# Patient Record
Sex: MALE | Race: BLACK OR AFRICAN AMERICAN | URBAN - METROPOLITAN AREA
[De-identification: names, ages, dates, MRNs, and addresses within clinical notes are randomized per-mention and may not be internally consistent; named-entity substitution may affect disease eponyms.]

---

## 2017-12-20 ENCOUNTER — HISTORICAL (OUTPATIENT)
Dept: LAB | Facility: HOSPITAL | Age: 42
End: 2017-12-20

## 2017-12-20 LAB
ABS NEUT (OLG): 3.31 X10(3)/MCL (ref 2.1–9.2)
ALBUMIN SERPL-MCNC: 4.2 GM/DL (ref 3.4–5)
ALBUMIN/GLOB SERPL: 1.2 {RATIO}
ALP SERPL-CCNC: 67 UNIT/L (ref 50–136)
ALT SERPL-CCNC: 48 UNIT/L (ref 12–78)
AST SERPL-CCNC: 19 UNIT/L (ref 15–37)
BASOPHILS # BLD AUTO: 0 X10(3)/MCL (ref 0–0.2)
BASOPHILS NFR BLD AUTO: 0 %
BILIRUB SERPL-MCNC: 0.4 MG/DL (ref 0.2–1)
BILIRUBIN DIRECT+TOT PNL SERPL-MCNC: 0.1 MG/DL (ref 0–0.2)
BILIRUBIN DIRECT+TOT PNL SERPL-MCNC: 0.3 MG/DL (ref 0–0.8)
BUN SERPL-MCNC: 15 MG/DL (ref 7–18)
CALCIUM SERPL-MCNC: 9.7 MG/DL (ref 8.5–10.1)
CHLORIDE SERPL-SCNC: 102 MMOL/L (ref 98–107)
CHOLEST SERPL-MCNC: 177 MG/DL (ref 0–200)
CHOLEST/HDLC SERPL: 2.6 {RATIO} (ref 0–5)
CO2 SERPL-SCNC: 26 MMOL/L (ref 21–32)
CREAT SERPL-MCNC: 0.9 MG/DL (ref 0.7–1.3)
CREAT UR-MCNC: 159 MG/DL
EOSINOPHIL # BLD AUTO: 0.1 X10(3)/MCL (ref 0–0.9)
EOSINOPHIL NFR BLD AUTO: 2 %
ERYTHROCYTE [DISTWIDTH] IN BLOOD BY AUTOMATED COUNT: 12.4 % (ref 11.5–17)
EST. AVERAGE GLUCOSE BLD GHB EST-MCNC: 134 MG/DL
GLOBULIN SER-MCNC: 3.4 GM/DL (ref 2.4–3.5)
GLUCOSE SERPL-MCNC: 176 MG/DL (ref 74–106)
HBA1C MFR BLD: 6.3 % (ref 4.2–6.3)
HCT VFR BLD AUTO: 44.9 % (ref 42–52)
HDLC SERPL-MCNC: 68 MG/DL (ref 35–60)
HGB BLD-MCNC: 15.6 GM/DL (ref 14–18)
LDLC SERPL CALC-MCNC: 89 MG/DL (ref 0–129)
LYMPHOCYTES # BLD AUTO: 1.5 X10(3)/MCL (ref 0.6–4.6)
LYMPHOCYTES NFR BLD AUTO: 27 %
MCH RBC QN AUTO: 28.9 PG (ref 27–31)
MCHC RBC AUTO-ENTMCNC: 34.7 GM/DL (ref 33–36)
MCV RBC AUTO: 83.3 FL (ref 80–94)
MICROALBUMIN UR-MCNC: 0.5 MG/DL
MICROALBUMIN/CREAT RATIO PNL UR: 3.3 MG/GM CR (ref 0–30)
MONOCYTES # BLD AUTO: 0.5 X10(3)/MCL (ref 0.1–1.3)
MONOCYTES NFR BLD AUTO: 10 %
NEUTROPHILS # BLD AUTO: 3.31 X10(3)/MCL (ref 2.1–9.2)
NEUTROPHILS NFR BLD AUTO: 60 %
PLATELET # BLD AUTO: 184 X10(3)/MCL (ref 130–400)
PMV BLD AUTO: 10.9 FL (ref 9.4–12.4)
POTASSIUM SERPL-SCNC: 4.5 MMOL/L (ref 3.5–5.1)
PROT SERPL-MCNC: 7.6 GM/DL (ref 6.4–8.2)
RBC # BLD AUTO: 5.39 X10(6)/MCL (ref 4.7–6.1)
SODIUM SERPL-SCNC: 137 MMOL/L (ref 136–145)
TESTOST SERPL-MCNC: 461.9 NG/DL (ref 241–827)
TRIGL SERPL-MCNC: 99 MG/DL (ref 30–150)
TSH SERPL-ACNC: 1.1 MIU/ML (ref 0.36–3.74)
VLDLC SERPL CALC-MCNC: 20 MG/DL
WBC # SPEC AUTO: 5.5 X10(3)/MCL (ref 4.5–11.5)

## 2018-08-27 ENCOUNTER — HISTORICAL (OUTPATIENT)
Dept: LAB | Facility: HOSPITAL | Age: 43
End: 2018-08-27

## 2018-08-27 LAB
BUN SERPL-MCNC: 19 MG/DL (ref 7–18)
CALCIUM SERPL-MCNC: 9.1 MG/DL (ref 8.5–10.1)
CHLORIDE SERPL-SCNC: 102 MMOL/L (ref 98–107)
CO2 SERPL-SCNC: 29 MMOL/L (ref 21–32)
CREAT SERPL-MCNC: 1.04 MG/DL (ref 0.7–1.3)
CREAT/UREA NIT SERPL: 18.3
EST. AVERAGE GLUCOSE BLD GHB EST-MCNC: 148 MG/DL
GLUCOSE SERPL-MCNC: 164 MG/DL (ref 74–106)
HBA1C MFR BLD: 6.8 % (ref 4.2–6.3)
POTASSIUM SERPL-SCNC: 4 MMOL/L (ref 3.5–5.1)
SODIUM SERPL-SCNC: 138 MMOL/L (ref 136–145)

## 2019-03-25 ENCOUNTER — HISTORICAL (OUTPATIENT)
Dept: LAB | Facility: HOSPITAL | Age: 44
End: 2019-03-25

## 2019-03-25 LAB
ABS NEUT (OLG): 3.3 X10(3)/MCL (ref 2.1–9.2)
ALBUMIN SERPL-MCNC: 4.4 GM/DL (ref 3.4–5)
ALBUMIN/GLOB SERPL: 1.4 RATIO (ref 1.1–2)
ALP SERPL-CCNC: 73 UNIT/L (ref 50–136)
ALT SERPL-CCNC: 55 UNIT/L (ref 12–78)
AST SERPL-CCNC: 30 UNIT/L (ref 15–37)
BASOPHILS # BLD AUTO: 0 X10(3)/MCL (ref 0–0.2)
BASOPHILS NFR BLD AUTO: 0 %
BILIRUB SERPL-MCNC: 0.2 MG/DL (ref 0.2–1)
BILIRUBIN DIRECT+TOT PNL SERPL-MCNC: 0.1 MG/DL (ref 0–0.5)
BILIRUBIN DIRECT+TOT PNL SERPL-MCNC: 0.1 MG/DL (ref 0–0.8)
BUN SERPL-MCNC: 18 MG/DL (ref 7–18)
CALCIUM SERPL-MCNC: 9.7 MG/DL (ref 8.5–10.1)
CHLORIDE SERPL-SCNC: 106 MMOL/L (ref 98–107)
CHOLEST SERPL-MCNC: 168 MG/DL (ref 0–200)
CHOLEST/HDLC SERPL: 2.4 {RATIO} (ref 0–5)
CO2 SERPL-SCNC: 28 MMOL/L (ref 21–32)
CREAT SERPL-MCNC: 1.12 MG/DL (ref 0.7–1.3)
CREAT UR-MCNC: 107 MG/DL
EOSINOPHIL # BLD AUTO: 0.1 X10(3)/MCL (ref 0–0.9)
EOSINOPHIL NFR BLD AUTO: 2 %
ERYTHROCYTE [DISTWIDTH] IN BLOOD BY AUTOMATED COUNT: 12 % (ref 11.5–17)
EST. AVERAGE GLUCOSE BLD GHB EST-MCNC: 140 MG/DL
GLOBULIN SER-MCNC: 3.2 GM/DL (ref 2.4–3.5)
GLUCOSE SERPL-MCNC: 123 MG/DL (ref 74–106)
HBA1C MFR BLD: 6.5 % (ref 4.2–6.3)
HCT VFR BLD AUTO: 47.3 % (ref 42–52)
HDLC SERPL-MCNC: 70 MG/DL (ref 35–60)
HGB BLD-MCNC: 15.5 GM/DL (ref 14–18)
LDLC SERPL CALC-MCNC: 72 MG/DL (ref 0–129)
LYMPHOCYTES # BLD AUTO: 1.6 X10(3)/MCL (ref 0.6–4.6)
LYMPHOCYTES NFR BLD AUTO: 28 %
MCH RBC QN AUTO: 29 PG (ref 27–31)
MCHC RBC AUTO-ENTMCNC: 32.8 GM/DL (ref 33–36)
MCV RBC AUTO: 88.4 FL (ref 80–94)
MICROALBUMIN UR-MCNC: <0.5 MG/DL
MICROALBUMIN/CREAT RATIO PNL UR: <4.7 MG/GM CR (ref 0–30)
MONOCYTES # BLD AUTO: 0.6 X10(3)/MCL (ref 0.1–1.3)
MONOCYTES NFR BLD AUTO: 10 %
NEUTROPHILS # BLD AUTO: 3.3 X10(3)/MCL (ref 2.1–9.2)
NEUTROPHILS NFR BLD AUTO: 59 %
NRBC BLD AUTO-RTO: 0.4 % (ref 0–0.2)
PLATELET # BLD AUTO: 188 X10(3)/MCL (ref 130–400)
PMV BLD AUTO: 11.6 FL (ref 9.4–12.4)
POTASSIUM SERPL-SCNC: 4.4 MMOL/L (ref 3.5–5.1)
PROT SERPL-MCNC: 7.6 GM/DL (ref 6.4–8.2)
RBC # BLD AUTO: 5.35 X10(6)/MCL (ref 4.7–6.1)
SODIUM SERPL-SCNC: 140 MMOL/L (ref 136–145)
TRIGL SERPL-MCNC: 130 MG/DL (ref 30–150)
VLDLC SERPL CALC-MCNC: 26 MG/DL
WBC # SPEC AUTO: 5.6 X10(3)/MCL (ref 4.5–11.5)

## 2019-05-21 ENCOUNTER — HISTORICAL (OUTPATIENT)
Dept: LAB | Facility: HOSPITAL | Age: 44
End: 2019-05-21

## 2019-05-21 LAB — TSH SERPL-ACNC: 1.45 MIU/L (ref 0.36–3.74)

## 2020-06-01 ENCOUNTER — HISTORICAL (OUTPATIENT)
Dept: ADMINISTRATIVE | Facility: HOSPITAL | Age: 45
End: 2020-06-01

## 2020-06-01 LAB
ABS NEUT (OLG): 2.9 X10(3)/MCL (ref 2.1–9.2)
ALBUMIN SERPL-MCNC: 4.2 GM/DL (ref 3.5–5)
ALBUMIN/GLOB SERPL: 1.4 RATIO (ref 1.1–2)
ALP SERPL-CCNC: 81 UNIT/L (ref 40–150)
ALT SERPL-CCNC: 42 UNIT/L (ref 0–55)
AST SERPL-CCNC: 24 UNIT/L (ref 5–34)
BASOPHILS # BLD AUTO: 0 X10(3)/MCL (ref 0–0.2)
BASOPHILS NFR BLD AUTO: 1 %
BILIRUB SERPL-MCNC: 0.4 MG/DL
BILIRUBIN DIRECT+TOT PNL SERPL-MCNC: 0.1 MG/DL (ref 0–0.5)
BILIRUBIN DIRECT+TOT PNL SERPL-MCNC: 0.3 MG/DL (ref 0–0.8)
BUN SERPL-MCNC: 14.8 MG/DL (ref 8.9–20.6)
CALCIUM SERPL-MCNC: 8.9 MG/DL (ref 8.4–10.2)
CHLORIDE SERPL-SCNC: 105 MMOL/L (ref 98–107)
CHOLEST SERPL-MCNC: 202 MG/DL
CHOLEST/HDLC SERPL: 4 {RATIO} (ref 0–5)
CO2 SERPL-SCNC: 25 MMOL/L (ref 22–29)
CREAT SERPL-MCNC: 1.17 MG/DL (ref 0.73–1.18)
EOSINOPHIL # BLD AUTO: 0.1 X10(3)/MCL (ref 0–0.9)
EOSINOPHIL NFR BLD AUTO: 2 %
ERYTHROCYTE [DISTWIDTH] IN BLOOD BY AUTOMATED COUNT: 12 % (ref 11.5–17)
EST. AVERAGE GLUCOSE BLD GHB EST-MCNC: 177.2 MG/DL
FT4I SERPL CALC-MCNC: 2.6 (ref 2.6–3.6)
GLOBULIN SER-MCNC: 2.9 GM/DL (ref 2.4–3.5)
GLUCOSE SERPL-MCNC: 277 MG/DL (ref 74–100)
HBA1C MFR BLD: 7.8 %
HCT VFR BLD AUTO: 44.5 % (ref 42–52)
HDLC SERPL-MCNC: 56 MG/DL (ref 35–60)
HGB BLD-MCNC: 15 GM/DL (ref 14–18)
LDLC SERPL CALC-MCNC: 128 MG/DL (ref 50–140)
LYMPHOCYTES # BLD AUTO: 1.2 X10(3)/MCL (ref 0.6–4.6)
LYMPHOCYTES NFR BLD AUTO: 26 %
MCH RBC QN AUTO: 29.4 PG (ref 27–31)
MCHC RBC AUTO-ENTMCNC: 33.7 GM/DL (ref 33–36)
MCV RBC AUTO: 87.1 FL (ref 80–94)
MONOCYTES # BLD AUTO: 0.4 X10(3)/MCL (ref 0.1–1.3)
MONOCYTES NFR BLD AUTO: 9 %
NEUTROPHILS # BLD AUTO: 2.9 X10(3)/MCL (ref 2.1–9.2)
NEUTROPHILS NFR BLD AUTO: 61 %
PLATELET # BLD AUTO: 173 X10(3)/MCL (ref 130–400)
PMV BLD AUTO: 11.7 FL (ref 9.4–12.4)
POTASSIUM SERPL-SCNC: 4.5 MMOL/L (ref 3.5–5.1)
PROT SERPL-MCNC: 7.1 GM/DL (ref 6.4–8.3)
PSA SERPL-MCNC: 0.56 NG/ML
RBC # BLD AUTO: 5.11 X10(6)/MCL (ref 4.7–6.1)
SODIUM SERPL-SCNC: 137 MMOL/L (ref 136–145)
T3FREE SERPL-MCNC: 3.76 PG/ML (ref 1.71–3.71)
T3RU NFR SERPL: 37.63 % (ref 31–39)
T4 FREE SERPL-MCNC: 0.9 NG/DL (ref 0.7–1.48)
T4 SERPL-MCNC: 6.92 UG/DL (ref 4.87–11.72)
TRIGL SERPL-MCNC: 90 MG/DL (ref 34–140)
TSH SERPL-ACNC: 0.79 UIU/ML (ref 0.35–4.94)
VLDLC SERPL CALC-MCNC: 18 MG/DL
WBC # SPEC AUTO: 4.7 X10(3)/MCL (ref 4.5–11.5)

## 2020-06-03 ENCOUNTER — HISTORICAL (OUTPATIENT)
Dept: ADMINISTRATIVE | Facility: HOSPITAL | Age: 45
End: 2020-06-03

## 2020-06-03 LAB
CREAT UR-MCNC: 199.2 MG/DL (ref 58–161)
MICROALBUMIN UR-MCNC: 6.1 UG/ML
MICROALBUMIN/CREAT RATIO PNL UR: 3.1 MG/GM CR (ref 0–30)

## 2020-12-02 ENCOUNTER — HISTORICAL (OUTPATIENT)
Dept: ADMINISTRATIVE | Facility: HOSPITAL | Age: 45
End: 2020-12-02

## 2020-12-02 LAB
EST. AVERAGE GLUCOSE BLD GHB EST-MCNC: 165.7 MG/DL
HBA1C MFR BLD: 7.4 %

## 2021-09-09 ENCOUNTER — HISTORICAL (OUTPATIENT)
Dept: ADMINISTRATIVE | Facility: HOSPITAL | Age: 46
End: 2021-09-09

## 2021-09-09 LAB
ALBUMIN SERPL-MCNC: 4 GM/DL (ref 3.5–5)
ALBUMIN/GLOB SERPL: 1.3 RATIO (ref 1.1–2)
ALP SERPL-CCNC: 100 UNIT/L (ref 40–150)
ALT SERPL-CCNC: 29 UNIT/L (ref 0–55)
AST SERPL-CCNC: 31 UNIT/L (ref 5–34)
BILIRUB SERPL-MCNC: 0.3 MG/DL
BILIRUBIN DIRECT+TOT PNL SERPL-MCNC: 0.1 MG/DL (ref 0–0.5)
BILIRUBIN DIRECT+TOT PNL SERPL-MCNC: 0.2 MG/DL (ref 0–0.8)
BUN SERPL-MCNC: 12.6 MG/DL (ref 8.9–20.6)
CALCIUM SERPL-MCNC: 9.7 MG/DL (ref 8.4–10.2)
CHLORIDE SERPL-SCNC: 103 MMOL/L (ref 98–107)
CHOLEST SERPL-MCNC: 171 MG/DL
CHOLEST/HDLC SERPL: 3 {RATIO} (ref 0–5)
CO2 SERPL-SCNC: 29 MMOL/L (ref 22–29)
CREAT SERPL-MCNC: 1.11 MG/DL (ref 0.73–1.18)
CREAT UR-MCNC: 72.6 MG/DL (ref 58–161)
DEPRECATED CALCIDIOL+CALCIFEROL SERPL-MC: 68.3 NG/ML (ref 30–80)
EST. AVERAGE GLUCOSE BLD GHB EST-MCNC: 177.2 MG/DL
GLOBULIN SER-MCNC: 3 GM/DL (ref 2.4–3.5)
GLUCOSE SERPL-MCNC: 204 MG/DL (ref 74–100)
HBA1C MFR BLD: 7.8 %
HDLC SERPL-MCNC: 56 MG/DL (ref 35–60)
LDLC SERPL CALC-MCNC: 86 MG/DL (ref 50–140)
MICROALBUMIN UR-MCNC: <5 UG/ML
MICROALBUMIN/CREAT RATIO PNL UR: <6.9 MG/GM CR (ref 0–30)
POTASSIUM SERPL-SCNC: 4.2 MMOL/L (ref 3.5–5.1)
PROT SERPL-MCNC: 7 GM/DL (ref 6.4–8.3)
PSA SERPL-MCNC: 0.47 NG/ML
SODIUM SERPL-SCNC: 142 MMOL/L (ref 136–145)
TRIGL SERPL-MCNC: 147 MG/DL (ref 34–140)
VLDLC SERPL CALC-MCNC: 29 MG/DL

## 2022-03-25 ENCOUNTER — HISTORICAL (OUTPATIENT)
Dept: ADMINISTRATIVE | Facility: HOSPITAL | Age: 47
End: 2022-03-25

## 2022-03-25 LAB
CREAT UR-MCNC: 45.7 MG/DL (ref 58–161)
EST. AVERAGE GLUCOSE BLD GHB EST-MCNC: 197.2 MG/DL
HBA1C MFR BLD: 8.5 %
MICROALBUMIN UR-MCNC: <5
MICROALBUMIN/CREAT RATIO PNL UR: <10.9 (ref 0–30)

## 2022-04-10 ENCOUNTER — HISTORICAL (OUTPATIENT)
Dept: ADMINISTRATIVE | Facility: HOSPITAL | Age: 47
End: 2022-04-10

## 2022-04-24 VITALS
OXYGEN SATURATION: 99 % | DIASTOLIC BLOOD PRESSURE: 88 MMHG | HEIGHT: 67 IN | BODY MASS INDEX: 29.76 KG/M2 | WEIGHT: 189.63 LBS | SYSTOLIC BLOOD PRESSURE: 138 MMHG

## 2022-05-20 RX ORDER — GLIPIZIDE 10 MG/1
10 TABLET, FILM COATED, EXTENDED RELEASE ORAL DAILY
Qty: 90 TABLET | Refills: 3 | Status: SHIPPED | OUTPATIENT
Start: 2022-05-20 | End: 2023-03-03 | Stop reason: SDUPTHER

## 2022-05-20 RX ORDER — GLIPIZIDE 10 MG/1
10 TABLET, FILM COATED, EXTENDED RELEASE ORAL DAILY
COMMUNITY
Start: 2021-12-28 | End: 2022-05-20 | Stop reason: SDUPTHER

## 2022-06-16 RX ORDER — PIOGLITAZONEHYDROCHLORIDE 30 MG/1
30 TABLET ORAL DAILY
Qty: 90 TABLET | Refills: 3 | Status: SHIPPED | OUTPATIENT
Start: 2022-06-16 | End: 2022-09-30

## 2022-06-16 RX ORDER — PIOGLITAZONEHYDROCHLORIDE 30 MG/1
30 TABLET ORAL DAILY
COMMUNITY
End: 2022-06-16 | Stop reason: SDUPTHER

## 2022-06-16 NOTE — TELEPHONE ENCOUNTER
----- Message from Jennifer Jin Patient Care Assistant sent at 6/16/2022  8:12 AM CDT -----  Regarding: return call  Type:  Patient Returning Call    Who Called: pt  Who Left Message for Patient: for nurse  Does the patient know what this is regarding?: medication  Would the patient rather a call back or a response via [facility]? C/b  Best Call Back Number: [phone] or [phone]  Additional Information: pt states was supposed to get a change to one of his meds and the pharmacy never received the new script and pt isnt sure the name keeps saying pioziltazone to be increased from 15mg to 30mg. Please call pt back to discuss.

## 2022-06-16 NOTE — TELEPHONE ENCOUNTER
Dr Sherman,    According to your last result note. You were increasing his Pioglitazone to 30mg. Can you send in a script for that dose please?

## 2022-07-14 ENCOUNTER — TELEPHONE (OUTPATIENT)
Dept: PRIMARY CARE CLINIC | Facility: CLINIC | Age: 47
End: 2022-07-14

## 2022-07-14 NOTE — TELEPHONE ENCOUNTER
Called patient and told him his last A1C which was in March. Pt confirmed and had no questions or concerns

## 2022-07-14 NOTE — TELEPHONE ENCOUNTER
----- Message from Jv Lopez sent at 7/14/2022  3:09 PM CDT -----  Regarding: TEST RESULTS  Type:  Patient Returning Call    Who Called: pt  Who Left Message for Patient: nurse   Does the patient know what this is regarding?: yes  Would the patient rather a call back or a response via [facility]?  Call   Best Call Back Number: [phone]  Additional Information: pt requesting a call back about his last A1C results

## 2022-08-09 RX ORDER — METFORMIN HYDROCHLORIDE 1000 MG/1
1000 TABLET ORAL 2 TIMES DAILY WITH MEALS
Qty: 180 TABLET | Refills: 3 | Status: SHIPPED | OUTPATIENT
Start: 2022-08-09 | End: 2022-09-30

## 2022-08-09 RX ORDER — METFORMIN HYDROCHLORIDE 1000 MG/1
1000 TABLET ORAL
COMMUNITY
Start: 2021-09-14 | End: 2022-08-09 | Stop reason: SDUPTHER

## 2022-08-09 NOTE — TELEPHONE ENCOUNTER
----- Message from Jv John sent at 8/9/2022  9:40 AM CDT -----  Regarding: rx request  Type:  RX Refill Request    Who Called: pt  Refill or New Rx: refill  RX Name and Strength: Metformin  How is the patient currently taking it? (ex. 1XDay): daily  Is this a 30 day or 90 day RX: 30  Preferred Pharmacy with phone number: [id number] - [place] [id number] AT [facility]  Local or Mail Order: local  Ordering Provider: lane  Would the patient rather a call back or a response via [facility]? na  Best Call Back Number: mobile  Additional Information:  patient stated he spoke with Lane at last appointment about some metformin being called in, but never received it  -- also said he thinks the mg was 500 but wasn't too sure

## 2022-08-19 RX ORDER — CHLORTHALIDONE 25 MG/1
25 TABLET ORAL DAILY
COMMUNITY
Start: 2022-07-17 | End: 2022-08-19 | Stop reason: SDUPTHER

## 2022-08-19 NOTE — TELEPHONE ENCOUNTER
Type:  RX Refill Request    Who Called: self  Refill or New Rx:refill  RX Name and Strength:chlorthalidone 25mg  How is the patient currently taking it? (ex. 1XDay):1 xday  Is this a 30 day or 90 day RX:90day  Preferred Pharmacy with phone number:[facility]  Local or Mail Order:local  Ordering Provider:rosalinda  Would the patient rather a call back or a response via [facility]? Call back  Best Call Back Number:[phone]  Additional Information:

## 2022-08-21 RX ORDER — CHLORTHALIDONE 25 MG/1
25 TABLET ORAL DAILY
Qty: 90 TABLET | Refills: 2 | Status: SHIPPED | OUTPATIENT
Start: 2022-08-21 | End: 2023-03-03 | Stop reason: SDUPTHER

## 2022-08-31 ENCOUNTER — DOCUMENTATION ONLY (OUTPATIENT)
Dept: PRIMARY CARE CLINIC | Facility: CLINIC | Age: 47
End: 2022-08-31

## 2022-08-31 LAB
LEFT EYE DM RETINOPATHY: NEGATIVE
RIGHT EYE DM RETINOPATHY: NEGATIVE

## 2022-09-06 DIAGNOSIS — I10 PRIMARY HYPERTENSION: Primary | ICD-10-CM

## 2022-09-06 RX ORDER — OLMESARTAN MEDOXOMIL 40 MG/1
40 TABLET ORAL DAILY
COMMUNITY
Start: 2022-05-26 | End: 2022-09-06 | Stop reason: SDUPTHER

## 2022-09-06 RX ORDER — OLMESARTAN MEDOXOMIL 40 MG/1
40 TABLET ORAL DAILY
Qty: 90 TABLET | Refills: 3 | Status: SHIPPED | OUTPATIENT
Start: 2022-09-06 | End: 2023-03-03 | Stop reason: SDUPTHER

## 2022-09-21 RX ORDER — SERTRALINE HYDROCHLORIDE 50 MG/1
50 TABLET, FILM COATED ORAL DAILY
COMMUNITY
Start: 2022-06-09 | End: 2023-04-24

## 2022-09-21 RX ORDER — HYDRALAZINE HYDROCHLORIDE 50 MG/1
50 TABLET, FILM COATED ORAL 2 TIMES DAILY
COMMUNITY
Start: 2022-08-25 | End: 2023-11-29 | Stop reason: SDUPTHER

## 2022-09-21 RX ORDER — INSULIN PUMP SYRINGE, 3 ML
EACH MISCELLANEOUS
COMMUNITY
Start: 2021-07-06

## 2022-09-21 RX ORDER — CARVEDILOL 25 MG/1
50 TABLET ORAL 2 TIMES DAILY
COMMUNITY
Start: 2022-08-27 | End: 2023-04-24 | Stop reason: SDUPTHER

## 2022-09-21 RX ORDER — AMLODIPINE BESYLATE 10 MG/1
TABLET ORAL
COMMUNITY
Start: 2022-08-15 | End: 2023-04-24 | Stop reason: SDUPTHER

## 2022-09-26 DIAGNOSIS — Z13.220 ENCOUNTER FOR LIPID SCREENING FOR CARDIOVASCULAR DISEASE: ICD-10-CM

## 2022-09-26 DIAGNOSIS — Z00.00 ENCOUNTER FOR WELLNESS EXAMINATION: Primary | ICD-10-CM

## 2022-09-26 DIAGNOSIS — Z13.6 ENCOUNTER FOR LIPID SCREENING FOR CARDIOVASCULAR DISEASE: ICD-10-CM

## 2022-09-28 ENCOUNTER — CLINICAL SUPPORT (OUTPATIENT)
Dept: PRIMARY CARE CLINIC | Facility: CLINIC | Age: 47
End: 2022-09-28
Payer: COMMERCIAL

## 2022-09-28 DIAGNOSIS — Z13.220 ENCOUNTER FOR LIPID SCREENING FOR CARDIOVASCULAR DISEASE: ICD-10-CM

## 2022-09-28 DIAGNOSIS — Z00.00 ENCOUNTER FOR WELLNESS EXAMINATION: ICD-10-CM

## 2022-09-28 DIAGNOSIS — Z13.6 ENCOUNTER FOR LIPID SCREENING FOR CARDIOVASCULAR DISEASE: ICD-10-CM

## 2022-09-28 LAB
ALBUMIN SERPL-MCNC: 4.4 GM/DL (ref 3.5–5)
ALBUMIN/GLOB SERPL: 1.4 RATIO (ref 1.1–2)
ALP SERPL-CCNC: 68 UNIT/L (ref 40–150)
ALT SERPL-CCNC: 27 UNIT/L (ref 0–55)
AST SERPL-CCNC: 22 UNIT/L (ref 5–34)
BASOPHILS # BLD AUTO: 0.02 X10(3)/MCL (ref 0–0.2)
BASOPHILS NFR BLD AUTO: 0.4 %
BILIRUBIN DIRECT+TOT PNL SERPL-MCNC: 0.6 MG/DL
BUN SERPL-MCNC: 16.1 MG/DL (ref 8.9–20.6)
CALCIUM SERPL-MCNC: 9.8 MG/DL (ref 8.4–10.2)
CHLORIDE SERPL-SCNC: 99 MMOL/L (ref 98–107)
CHOLEST SERPL-MCNC: 206 MG/DL
CHOLEST/HDLC SERPL: 4 {RATIO} (ref 0–5)
CO2 SERPL-SCNC: 28 MMOL/L (ref 22–29)
CREAT SERPL-MCNC: 1.18 MG/DL (ref 0.73–1.18)
EOSINOPHIL # BLD AUTO: 0.21 X10(3)/MCL (ref 0–0.9)
EOSINOPHIL NFR BLD AUTO: 4.2 %
ERYTHROCYTE [DISTWIDTH] IN BLOOD BY AUTOMATED COUNT: 12.3 % (ref 11.5–17)
EST. AVERAGE GLUCOSE BLD GHB EST-MCNC: 197.3 MG/DL
GFR SERPLBLD CREATININE-BSD FMLA CKD-EPI: >60 MLS/MIN/1.73/M2
GLOBULIN SER-MCNC: 3.1 GM/DL (ref 2.4–3.5)
GLUCOSE SERPL-MCNC: 262 MG/DL (ref 74–100)
HBA1C MFR BLD: 8.5 %
HCT VFR BLD AUTO: 46.5 % (ref 42–52)
HDLC SERPL-MCNC: 58 MG/DL (ref 35–60)
HGB BLD-MCNC: 15.6 GM/DL (ref 14–18)
IMM GRANULOCYTES # BLD AUTO: 0.01 X10(3)/MCL (ref 0–0.04)
IMM GRANULOCYTES NFR BLD AUTO: 0.2 %
LDLC SERPL CALC-MCNC: 107 MG/DL (ref 50–140)
LYMPHOCYTES # BLD AUTO: 1.21 X10(3)/MCL (ref 0.6–4.6)
LYMPHOCYTES NFR BLD AUTO: 24.1 %
MCH RBC QN AUTO: 29.5 PG (ref 27–31)
MCHC RBC AUTO-ENTMCNC: 33.5 MG/DL (ref 33–36)
MCV RBC AUTO: 88.1 FL (ref 80–94)
MONOCYTES # BLD AUTO: 0.41 X10(3)/MCL (ref 0.1–1.3)
MONOCYTES NFR BLD AUTO: 8.2 %
NEUTROPHILS # BLD AUTO: 3.2 X10(3)/MCL (ref 2.1–9.2)
NEUTROPHILS NFR BLD AUTO: 62.9 %
NRBC BLD AUTO-RTO: 0 %
PLATELET # BLD AUTO: 175 X10(3)/MCL (ref 130–400)
PMV BLD AUTO: 11.8 FL (ref 7.4–10.4)
POTASSIUM SERPL-SCNC: 3.5 MMOL/L (ref 3.5–5.1)
PROT SERPL-MCNC: 7.5 GM/DL (ref 6.4–8.3)
PSA SERPL-MCNC: 0.46 NG/ML
RBC # BLD AUTO: 5.28 X10(6)/MCL (ref 4.7–6.1)
SODIUM SERPL-SCNC: 136 MMOL/L (ref 136–145)
TRIGL SERPL-MCNC: 206 MG/DL (ref 34–140)
TSH SERPL-ACNC: 0.74 UIU/ML (ref 0.35–4.94)
VLDLC SERPL CALC-MCNC: 41 MG/DL
WBC # SPEC AUTO: 5 X10(3)/MCL (ref 4.5–11.5)

## 2022-09-28 PROCEDURE — 86803 HEPATITIS C AB TEST: CPT

## 2022-09-28 PROCEDURE — 36415 COLL VENOUS BLD VENIPUNCTURE: CPT

## 2022-09-28 PROCEDURE — 83036 HEMOGLOBIN GLYCOSYLATED A1C: CPT | Performed by: GENERAL PRACTICE

## 2022-09-28 PROCEDURE — 84153 ASSAY OF PSA TOTAL: CPT | Performed by: GENERAL PRACTICE

## 2022-09-28 PROCEDURE — 80053 COMPREHEN METABOLIC PANEL: CPT | Performed by: GENERAL PRACTICE

## 2022-09-28 PROCEDURE — 84443 ASSAY THYROID STIM HORMONE: CPT | Performed by: GENERAL PRACTICE

## 2022-09-28 PROCEDURE — 85025 COMPLETE CBC W/AUTO DIFF WBC: CPT | Performed by: GENERAL PRACTICE

## 2022-09-28 PROCEDURE — 80061 LIPID PANEL: CPT | Performed by: GENERAL PRACTICE

## 2022-09-29 PROBLEM — E78.5 DYSLIPIDEMIA: Chronic | Status: ACTIVE | Noted: 2022-09-29

## 2022-09-29 PROBLEM — F41.1 GENERALIZED ANXIETY DISORDER: Chronic | Status: ACTIVE | Noted: 2022-09-29

## 2022-09-29 PROBLEM — I10 PRIMARY HYPERTENSION: Chronic | Status: ACTIVE | Noted: 2022-09-29

## 2022-09-29 PROBLEM — E66.9 OBESITY: Status: ACTIVE | Noted: 2022-09-29

## 2022-09-29 PROBLEM — E11.9 DIABETES MELLITUS: Status: ACTIVE | Noted: 2022-09-29

## 2022-09-29 PROBLEM — Z78.9 DRUG INTOLERANCE: Status: ACTIVE | Noted: 2022-09-29

## 2022-09-29 PROBLEM — M77.11 LATERAL EPICONDYLITIS OF RIGHT ELBOW: Status: ACTIVE | Noted: 2022-09-29

## 2022-09-29 PROBLEM — E11.9 TYPE 2 DIABETES MELLITUS WITHOUT COMPLICATION, WITHOUT LONG-TERM CURRENT USE OF INSULIN: Chronic | Status: ACTIVE | Noted: 2022-09-29

## 2022-09-29 PROBLEM — E11.65 TYPE 2 DIABETES MELLITUS WITH HYPERGLYCEMIA, WITHOUT LONG-TERM CURRENT USE OF INSULIN: Chronic | Status: ACTIVE | Noted: 2022-09-29

## 2022-09-29 PROBLEM — E78.5 HYPERLIPIDEMIA: Status: ACTIVE | Noted: 2022-09-29

## 2022-09-29 PROBLEM — I10 HYPERTENSION: Status: ACTIVE | Noted: 2022-09-29

## 2022-09-29 PROBLEM — E66.9 OBESITY: Chronic | Status: ACTIVE | Noted: 2022-09-29

## 2022-09-29 PROBLEM — F41.1 GENERALIZED ANXIETY DISORDER: Status: ACTIVE | Noted: 2022-09-29

## 2022-09-29 PROBLEM — E11.65 TYPE 2 DIABETES MELLITUS WITH HYPERGLYCEMIA, WITHOUT LONG-TERM CURRENT USE OF INSULIN: Status: ACTIVE | Noted: 2022-09-29

## 2022-09-29 PROBLEM — Z78.9 STATIN INTOLERANCE: Chronic | Status: ACTIVE | Noted: 2022-09-29

## 2022-09-29 LAB — HCV AB SERPL QL IA: NONREACTIVE

## 2022-09-30 ENCOUNTER — OFFICE VISIT (OUTPATIENT)
Dept: PRIMARY CARE CLINIC | Facility: CLINIC | Age: 47
End: 2022-09-30
Payer: COMMERCIAL

## 2022-09-30 VITALS
SYSTOLIC BLOOD PRESSURE: 130 MMHG | OXYGEN SATURATION: 100 % | DIASTOLIC BLOOD PRESSURE: 86 MMHG | HEART RATE: 62 BPM | TEMPERATURE: 98 F | RESPIRATION RATE: 18 BRPM | WEIGHT: 192 LBS | HEIGHT: 67 IN | BODY MASS INDEX: 30.13 KG/M2

## 2022-09-30 DIAGNOSIS — F41.1 GENERALIZED ANXIETY DISORDER: Chronic | ICD-10-CM

## 2022-09-30 DIAGNOSIS — I10 PRIMARY HYPERTENSION: Chronic | ICD-10-CM

## 2022-09-30 DIAGNOSIS — E11.65 TYPE 2 DIABETES MELLITUS WITH HYPERGLYCEMIA, WITHOUT LONG-TERM CURRENT USE OF INSULIN: Chronic | ICD-10-CM

## 2022-09-30 DIAGNOSIS — Z12.5 SCREENING FOR PROSTATE CANCER: ICD-10-CM

## 2022-09-30 DIAGNOSIS — E78.5 DYSLIPIDEMIA: Chronic | ICD-10-CM

## 2022-09-30 DIAGNOSIS — Z00.00 WELLNESS EXAMINATION: Primary | ICD-10-CM

## 2022-09-30 PROCEDURE — 3079F DIAST BP 80-89 MM HG: CPT | Mod: CPTII,,, | Performed by: GENERAL PRACTICE

## 2022-09-30 PROCEDURE — 3061F PR NEG MICROALBUMINURIA RESULT DOCUMENTED/REVIEW: ICD-10-PCS | Mod: CPTII,,, | Performed by: GENERAL PRACTICE

## 2022-09-30 PROCEDURE — 3008F PR BODY MASS INDEX (BMI) DOCUMENTED: ICD-10-PCS | Mod: CPTII,,, | Performed by: GENERAL PRACTICE

## 2022-09-30 PROCEDURE — 99396 PR PREVENTIVE VISIT,EST,40-64: ICD-10-PCS | Mod: ,,, | Performed by: GENERAL PRACTICE

## 2022-09-30 PROCEDURE — 3061F NEG MICROALBUMINURIA REV: CPT | Mod: CPTII,,, | Performed by: GENERAL PRACTICE

## 2022-09-30 PROCEDURE — 4010F ACE/ARB THERAPY RXD/TAKEN: CPT | Mod: CPTII,,, | Performed by: GENERAL PRACTICE

## 2022-09-30 PROCEDURE — 3066F PR DOCUMENTATION OF TREATMENT FOR NEPHROPATHY: ICD-10-PCS | Mod: CPTII,,, | Performed by: GENERAL PRACTICE

## 2022-09-30 PROCEDURE — 1160F PR REVIEW ALL MEDS BY PRESCRIBER/CLIN PHARMACIST DOCUMENTED: ICD-10-PCS | Mod: CPTII,,, | Performed by: GENERAL PRACTICE

## 2022-09-30 PROCEDURE — 3066F NEPHROPATHY DOC TX: CPT | Mod: CPTII,,, | Performed by: GENERAL PRACTICE

## 2022-09-30 PROCEDURE — 3079F PR MOST RECENT DIASTOLIC BLOOD PRESSURE 80-89 MM HG: ICD-10-PCS | Mod: CPTII,,, | Performed by: GENERAL PRACTICE

## 2022-09-30 PROCEDURE — 3008F BODY MASS INDEX DOCD: CPT | Mod: CPTII,,, | Performed by: GENERAL PRACTICE

## 2022-09-30 PROCEDURE — 3075F PR MOST RECENT SYSTOLIC BLOOD PRESS GE 130-139MM HG: ICD-10-PCS | Mod: CPTII,,, | Performed by: GENERAL PRACTICE

## 2022-09-30 PROCEDURE — 99396 PREV VISIT EST AGE 40-64: CPT | Mod: ,,, | Performed by: GENERAL PRACTICE

## 2022-09-30 PROCEDURE — 1160F RVW MEDS BY RX/DR IN RCRD: CPT | Mod: CPTII,,, | Performed by: GENERAL PRACTICE

## 2022-09-30 PROCEDURE — 4010F PR ACE/ARB THEARPY RXD/TAKEN: ICD-10-PCS | Mod: CPTII,,, | Performed by: GENERAL PRACTICE

## 2022-09-30 PROCEDURE — 3075F SYST BP GE 130 - 139MM HG: CPT | Mod: CPTII,,, | Performed by: GENERAL PRACTICE

## 2022-09-30 PROCEDURE — 1159F MED LIST DOCD IN RCRD: CPT | Mod: CPTII,,, | Performed by: GENERAL PRACTICE

## 2022-09-30 PROCEDURE — 1159F PR MEDICATION LIST DOCUMENTED IN MEDICAL RECORD: ICD-10-PCS | Mod: CPTII,,, | Performed by: GENERAL PRACTICE

## 2022-09-30 RX ORDER — PIOGLITAZONEHYDROCHLORIDE 45 MG/1
45 TABLET ORAL DAILY
Qty: 30 TABLET | Refills: 11 | Status: SHIPPED | OUTPATIENT
Start: 2022-09-30 | End: 2023-03-03 | Stop reason: SDUPTHER

## 2022-09-30 RX ORDER — HYDROCHLOROTHIAZIDE 25 MG/1
25 TABLET ORAL DAILY
COMMUNITY
Start: 2022-09-12 | End: 2023-03-03

## 2022-09-30 RX ORDER — METFORMIN HYDROCHLORIDE 500 MG/1
500 TABLET ORAL 2 TIMES DAILY
COMMUNITY
Start: 2022-09-12 | End: 2022-09-30 | Stop reason: SINTOL

## 2022-09-30 RX ORDER — METFORMIN HYDROCHLORIDE 500 MG/1
500 TABLET, FILM COATED, EXTENDED RELEASE ORAL
Qty: 30 TABLET | Refills: 11 | Status: SHIPPED | OUTPATIENT
Start: 2022-09-30 | End: 2023-03-03 | Stop reason: SDUPTHER

## 2022-09-30 RX ORDER — EZETIMIBE 10 MG/1
10 TABLET ORAL DAILY
COMMUNITY
Start: 2022-09-12 | End: 2023-04-24

## 2022-09-30 RX ORDER — DAPAGLIFLOZIN 10 MG/1
10 TABLET, FILM COATED ORAL DAILY
COMMUNITY
Start: 2022-09-12 | End: 2024-03-12

## 2022-09-30 NOTE — PROGRESS NOTES
"Subjective:       Patient ID: Humphrey Gallegos is a 47 y.o. male.    Chief Complaint: No chief complaint on file.    Patient presents to the clinic today for wellness examination.  Current and past medical history reviewed  Pertinent family and social history reviewed    Colorectal cancer screening:  CRC screening reviewed  Prostate CA screening:  Prostate CA screening reviewed  Vaccinations due:  All vaccinations due and/or desired have been addressed and given.    No complaints today.      Review of Systems   Constitutional: Negative.  Negative for fatigue and fever.   HENT: Negative.  Negative for sore throat and trouble swallowing.    Eyes: Negative.  Negative for redness and visual disturbance.   Respiratory: Negative.  Negative for chest tightness and shortness of breath.    Cardiovascular: Negative.  Negative for chest pain.   Gastrointestinal: Negative.  Negative for abdominal pain, blood in stool and nausea.   Endocrine: Negative.  Negative for cold intolerance, heat intolerance and polyuria.   Genitourinary: Negative.    Musculoskeletal: Negative.  Negative for arthralgias, gait problem and joint swelling.   Integumentary:  Negative for rash. Negative.   Neurological: Negative.  Negative for dizziness, weakness and headaches.   Psychiatric/Behavioral: Negative.  Negative for agitation and dysphoric mood.        Objective:     Vitals:    09/30/22 0832   BP: 130/86   Pulse: 62   Resp: 18   Temp: 97.7 °F (36.5 °C)   SpO2: 100%   Weight: 87.1 kg (192 lb)   Height: 5' 7" (1.702 m)   Body mass index is 30.07 kg/m².     Physical Exam  Constitutional:       Appearance: Normal appearance.   HENT:      Head: Normocephalic.      Mouth/Throat:      Pharynx: Oropharynx is clear.   Eyes:      Conjunctiva/sclera: Conjunctivae normal.      Pupils: Pupils are equal, round, and reactive to light.   Cardiovascular:      Rate and Rhythm: Normal rate and regular rhythm.      Heart sounds: Normal heart sounds.   Pulmonary:      " Effort: Pulmonary effort is normal.      Breath sounds: Normal breath sounds.   Abdominal:      General: Abdomen is flat.      Palpations: Abdomen is soft.   Musculoskeletal:         General: Normal range of motion.      Cervical back: Neck supple.   Skin:     General: Skin is warm and dry.   Neurological:      General: No focal deficit present.      Mental Status: He is oriented to person, place, and time.   Psychiatric:         Mood and Affect: Mood normal.         Behavior: Behavior normal.         Thought Content: Thought content normal.         Judgment: Judgment normal.       Diabetic foot exam:  Examination performed by myself, Dr. Sherman    Examination of the skin/nails:  Bilateral examination of the feet reveals no significant skin lesions/abnormalities, ulcerations, ulcers, nail abnormalities.  Patient does appear to be using appropriate footwear.    Peripheral pulses:  Dorsalis pedis pulse-normal bilaterally  Posterior tibialis pulse-normal bilaterally  Appropriate amount of foot hair, capillary refill normal    Five point sensation test:  Left foot sensation test-no significant loss of sensation at all points tested  Right foot to cessation test-no significant loss of sensation at all points tested    Foot exam risk category:  0-2 (none or minimal loss of protective sensation with no additional findings)    Foot exam intervention:  Foot care education done at appropriate risk level  Lab Results   Component Value Date     09/28/2022    K 3.5 09/28/2022    CO2 28 09/28/2022    BUN 16.1 09/28/2022    CREATININE 1.18 09/28/2022    CALCIUM 9.8 09/28/2022    ALBUMIN 4.4 09/28/2022    BILITOT 0.6 09/28/2022    ALKPHOS 68 09/28/2022    AST 22 09/28/2022    ALT 27 09/28/2022    EGFRNONAA >60 09/09/2021     Lab Results   Component Value Date    WBC 5.0 09/28/2022    RBC 5.28 09/28/2022    HGB 15.6 09/28/2022    HCT 46.5 09/28/2022    MCV 88.1 09/28/2022    RDW 12.3 09/28/2022     09/28/2022      Lab  Results   Component Value Date    CHOL 206 (H) 09/28/2022    TRIG 206 (H) 09/28/2022    HDL 58 09/28/2022    .00 09/28/2022    TOTALCHOLEST 4 09/28/2022     Lab Results   Component Value Date    TSH 0.7399 09/28/2022     Lab Results   Component Value Date    HGBA1C 8.5 (H) 09/28/2022        Lab Results   Component Value Date    PSA 0.46 09/28/2022     No components found for: VITAMIND    Assessment:     Problem List Items Addressed This Visit          Psychiatric    Generalized anxiety disorder (Chronic)    Current Assessment & Plan     Medical condition is currently stable, continue current treatment plan.            Cardiac/Vascular    Primary hypertension (Chronic)    Current Assessment & Plan     Blood pressures appear to be adequately controlled with current treatment plan, continue medical management and continue home blood pressure checks.  Blood pressure should be checked as discussed during medical visits, and average blood pressure should be no greater than 130/80.         Dyslipidemia (Chronic)    Current Assessment & Plan     Fairly good control on Zetia, continue medication.            Endocrine    Type 2 diabetes mellitus with hyperglycemia, without long-term current use of insulin (Chronic)    Current Assessment & Plan     Make some medication changes, discontinue regular metformin, switch to metformin  mg daily, increase Actos to 45 mg daily, patient feels like he can change his diet a bit to improve his A1c level, he would do that, and we will recheck his A1c, urine microalbumin level, and have a visit in three months.         Relevant Medications    metFORMIN (GLUMETZA) 500 MG (MOD) 24hr tablet    pioglitazone (ACTOS) 45 MG tablet    Other Relevant Orders    Hemoglobin A1C    Microalbumin/Creatinine Ratio, Urine     Other Visit Diagnoses       Wellness examination    -  Primary    Overall health status was reviewed.  Good health habits reinforced.  Annual wellness exams recommended.     Screening for prostate cancer        Prostate specific antigen blood test obtained was essentially normal, there are no significant concerns relating to prostate cancer at this time.                 Medication List with Changes/Refills   New Medications    METFORMIN (GLUMETZA) 500 MG (MOD) 24HR TABLET    Take 1 tablet (500 mg total) by mouth daily with breakfast.    PIOGLITAZONE (ACTOS) 45 MG TABLET    Take 1 tablet (45 mg total) by mouth once daily.   Current Medications    AMLODIPINE (NORVASC) 10 MG TABLET        BLOOD-GLUCOSE METER KIT      ACCU-CHEK GUIDE TEST STRIPS 100S, See Instructions, USE TO TEST BLOOD GLUCOSE ONCE DAILY AS DIRECTED, # 100 unknown unit, 2 Refill(s), Pharmacy: [facility] #[id number], 169, cm, Height/Length Dosing, 12/03/20 14:03:00 CST, 88.5, kg, Weight Dosing...    CARVEDILOL (COREG) 25 MG TABLET    Take 50 mg by mouth 2 (two) times daily.    CHLORTHALIDONE (HYGROTEN) 25 MG TAB    Take 1 tablet (25 mg total) by mouth once daily.    EZETIMIBE (ZETIA) 10 MG TABLET    Take 10 mg by mouth once daily.    FARXIGA 10 MG TABLET    Take 10 mg by mouth once daily.    GLIPIZIDE (GLUCOTROL) 10 MG TR24    Take 1 tablet (10 mg total) by mouth once daily.    HYDRALAZINE (APRESOLINE) 50 MG TABLET    Take 50 mg by mouth 2 (two) times daily.    HYDROCHLOROTHIAZIDE (HYDRODIURIL) 25 MG TABLET    Take 25 mg by mouth once daily.    OLMESARTAN (BENICAR) 40 MG TABLET    Take 1 tablet (40 mg total) by mouth once daily.    SERTRALINE (ZOLOFT) 50 MG TABLET    Take 50 mg by mouth once daily.   Discontinued Medications    METFORMIN (GLUCOPHAGE) 1000 MG TABLET    Take 1 tablet (1,000 mg total) by mouth 2 (two) times daily with meals.    METFORMIN (GLUCOPHAGE) 500 MG TABLET    Take 500 mg by mouth 2 (two) times daily.    PIOGLITAZONE (ACTOS) 30 MG TABLET    Take 1 tablet (30 mg total) by mouth once daily.     Plan:             Follow up in about 3 months (around 12/30/2022) for DM F/up.

## 2022-09-30 NOTE — ASSESSMENT & PLAN NOTE
Make some medication changes, discontinue regular metformin, switch to metformin  mg daily, increase Actos to 45 mg daily, patient feels like he can change his diet a bit to improve his A1c level, he would do that, and we will recheck his A1c, urine microalbumin level, and have a visit in three months.

## 2022-11-08 ENCOUNTER — DOCUMENTATION ONLY (OUTPATIENT)
Dept: ADMINISTRATIVE | Facility: HOSPITAL | Age: 47
End: 2022-11-08
Payer: COMMERCIAL

## 2023-02-23 ENCOUNTER — PATIENT OUTREACH (OUTPATIENT)
Dept: ADMINISTRATIVE | Facility: HOSPITAL | Age: 48
End: 2023-02-23
Payer: COMMERCIAL

## 2023-02-23 NOTE — PROGRESS NOTES
Population Health Outreach.    Follow up call to Humphrey, regarding  wellness screenings, voicemail has no greeting , I did not leave a message.

## 2023-03-02 ENCOUNTER — TELEPHONE (OUTPATIENT)
Dept: PRIMARY CARE CLINIC | Facility: CLINIC | Age: 48
End: 2023-03-02
Payer: COMMERCIAL

## 2023-03-03 ENCOUNTER — DOCUMENTATION ONLY (OUTPATIENT)
Dept: PRIMARY CARE CLINIC | Facility: CLINIC | Age: 48
End: 2023-03-03
Payer: COMMERCIAL

## 2023-03-03 DIAGNOSIS — I10 PRIMARY HYPERTENSION: ICD-10-CM

## 2023-03-03 DIAGNOSIS — E11.65 TYPE 2 DIABETES MELLITUS WITH HYPERGLYCEMIA, WITHOUT LONG-TERM CURRENT USE OF INSULIN: Chronic | ICD-10-CM

## 2023-03-03 LAB
LEFT EYE DM RETINOPATHY: POSITIVE
RIGHT EYE DM RETINOPATHY: POSITIVE

## 2023-03-03 RX ORDER — GLIPIZIDE 10 MG/1
10 TABLET, FILM COATED, EXTENDED RELEASE ORAL DAILY
Qty: 30 TABLET | Refills: 0 | Status: SHIPPED | OUTPATIENT
Start: 2023-03-03 | End: 2023-04-24 | Stop reason: SDUPTHER

## 2023-03-03 RX ORDER — METFORMIN HYDROCHLORIDE 500 MG/1
500 TABLET, FILM COATED, EXTENDED RELEASE ORAL
Qty: 30 TABLET | Refills: 0 | Status: SHIPPED | OUTPATIENT
Start: 2023-03-03 | End: 2023-04-24

## 2023-03-03 RX ORDER — OLMESARTAN MEDOXOMIL 40 MG/1
40 TABLET ORAL DAILY
Qty: 30 TABLET | Refills: 0 | Status: SHIPPED | OUTPATIENT
Start: 2023-03-03 | End: 2023-04-17 | Stop reason: SDUPTHER

## 2023-03-03 RX ORDER — CHLORTHALIDONE 25 MG/1
25 TABLET ORAL DAILY
Qty: 30 TABLET | Refills: 0 | Status: SHIPPED | OUTPATIENT
Start: 2023-03-03 | End: 2023-04-17 | Stop reason: SDUPTHER

## 2023-03-03 RX ORDER — PIOGLITAZONEHYDROCHLORIDE 45 MG/1
45 TABLET ORAL DAILY
Qty: 30 TABLET | Refills: 0 | Status: SHIPPED | OUTPATIENT
Start: 2023-03-03 | End: 2023-04-17 | Stop reason: SDUPTHER

## 2023-03-03 NOTE — TELEPHONE ENCOUNTER
Pt  stated his insurance changed. Josafat told him he has refills on medications. But due to insurance change, they no longer allow each script with additional refills. No refill allowed

## 2023-04-13 ENCOUNTER — PATIENT OUTREACH (OUTPATIENT)
Dept: ADMINISTRATIVE | Facility: HOSPITAL | Age: 48
End: 2023-04-13
Payer: COMMERCIAL

## 2023-04-13 NOTE — PROGRESS NOTES
Population Health Outreach.    Patient has a follow up 4/19/2022, overdue A1c, and Colon screening.

## 2023-04-18 DIAGNOSIS — E11.65 TYPE 2 DIABETES MELLITUS WITH HYPERGLYCEMIA, WITHOUT LONG-TERM CURRENT USE OF INSULIN: Primary | Chronic | ICD-10-CM

## 2023-04-19 ENCOUNTER — CLINICAL SUPPORT (OUTPATIENT)
Dept: PRIMARY CARE CLINIC | Facility: CLINIC | Age: 48
End: 2023-04-19
Payer: COMMERCIAL

## 2023-04-19 DIAGNOSIS — E11.65 TYPE 2 DIABETES MELLITUS WITH HYPERGLYCEMIA, WITHOUT LONG-TERM CURRENT USE OF INSULIN: Primary | Chronic | ICD-10-CM

## 2023-04-19 LAB
CREAT UR-MCNC: 84.1 MG/DL (ref 63–166)
EST. AVERAGE GLUCOSE BLD GHB EST-MCNC: 185.8 MG/DL
HBA1C MFR BLD: 8.1 %
MICROALBUMIN UR-MCNC: 7.2 UG/ML
MICROALBUMIN/CREAT RATIO PNL UR: 8.6 MG/GM CR (ref 0–30)

## 2023-04-19 PROCEDURE — 82043 UR ALBUMIN QUANTITATIVE: CPT | Performed by: GENERAL PRACTICE

## 2023-04-19 PROCEDURE — 36415 PR COLLECTION VENOUS BLOOD,VENIPUNCTURE: ICD-10-PCS | Mod: ,,, | Performed by: GENERAL PRACTICE

## 2023-04-19 PROCEDURE — 36415 COLL VENOUS BLD VENIPUNCTURE: CPT | Mod: ,,, | Performed by: GENERAL PRACTICE

## 2023-04-19 PROCEDURE — 36415 COLL VENOUS BLD VENIPUNCTURE: CPT

## 2023-04-19 PROCEDURE — 83036 HEMOGLOBIN GLYCOSYLATED A1C: CPT | Performed by: GENERAL PRACTICE

## 2023-04-19 NOTE — PROGRESS NOTES
Patient here to draw A1C and Micro labs with butterfly gauge needle. Patient was drawn in right arm .No complications or issues occurred. Patient expressed no pain.

## 2023-04-24 ENCOUNTER — OFFICE VISIT (OUTPATIENT)
Dept: PRIMARY CARE CLINIC | Facility: CLINIC | Age: 48
End: 2023-04-24
Payer: COMMERCIAL

## 2023-04-24 VITALS
WEIGHT: 192 LBS | OXYGEN SATURATION: 99 % | HEART RATE: 79 BPM | SYSTOLIC BLOOD PRESSURE: 138 MMHG | BODY MASS INDEX: 30.13 KG/M2 | RESPIRATION RATE: 18 BRPM | DIASTOLIC BLOOD PRESSURE: 90 MMHG | HEIGHT: 67 IN

## 2023-04-24 DIAGNOSIS — E11.65 TYPE 2 DIABETES MELLITUS WITH HYPERGLYCEMIA, WITHOUT LONG-TERM CURRENT USE OF INSULIN: Chronic | ICD-10-CM

## 2023-04-24 DIAGNOSIS — E66.09 CLASS 1 OBESITY DUE TO EXCESS CALORIES WITH SERIOUS COMORBIDITY AND BODY MASS INDEX (BMI) OF 30.0 TO 30.9 IN ADULT: Chronic | ICD-10-CM

## 2023-04-24 DIAGNOSIS — F41.1 GENERALIZED ANXIETY DISORDER: Chronic | ICD-10-CM

## 2023-04-24 DIAGNOSIS — I10 PRIMARY HYPERTENSION: Chronic | ICD-10-CM

## 2023-04-24 DIAGNOSIS — E78.5 DYSLIPIDEMIA: Chronic | ICD-10-CM

## 2023-04-24 PROBLEM — E66.811 CLASS 1 OBESITY DUE TO EXCESS CALORIES WITH SERIOUS COMORBIDITY AND BODY MASS INDEX (BMI) OF 30.0 TO 30.9 IN ADULT: Chronic | Status: ACTIVE | Noted: 2022-09-29

## 2023-04-24 PROBLEM — M77.11 LATERAL EPICONDYLITIS OF RIGHT ELBOW: Status: RESOLVED | Noted: 2022-09-29 | Resolved: 2023-04-24

## 2023-04-24 PROCEDURE — 3080F PR MOST RECENT DIASTOLIC BLOOD PRESSURE >= 90 MM HG: ICD-10-PCS | Mod: CPTII,,, | Performed by: GENERAL PRACTICE

## 2023-04-24 PROCEDURE — 99213 OFFICE O/P EST LOW 20 MIN: CPT | Mod: ,,, | Performed by: GENERAL PRACTICE

## 2023-04-24 PROCEDURE — 1160F PR REVIEW ALL MEDS BY PRESCRIBER/CLIN PHARMACIST DOCUMENTED: ICD-10-PCS | Mod: CPTII,,, | Performed by: GENERAL PRACTICE

## 2023-04-24 PROCEDURE — 3066F PR DOCUMENTATION OF TREATMENT FOR NEPHROPATHY: ICD-10-PCS | Mod: CPTII,,, | Performed by: GENERAL PRACTICE

## 2023-04-24 PROCEDURE — 3008F PR BODY MASS INDEX (BMI) DOCUMENTED: ICD-10-PCS | Mod: CPTII,,, | Performed by: GENERAL PRACTICE

## 2023-04-24 PROCEDURE — 3061F NEG MICROALBUMINURIA REV: CPT | Mod: CPTII,,, | Performed by: GENERAL PRACTICE

## 2023-04-24 PROCEDURE — 3075F PR MOST RECENT SYSTOLIC BLOOD PRESS GE 130-139MM HG: ICD-10-PCS | Mod: CPTII,,, | Performed by: GENERAL PRACTICE

## 2023-04-24 PROCEDURE — 1159F MED LIST DOCD IN RCRD: CPT | Mod: CPTII,,, | Performed by: GENERAL PRACTICE

## 2023-04-24 PROCEDURE — 1159F PR MEDICATION LIST DOCUMENTED IN MEDICAL RECORD: ICD-10-PCS | Mod: CPTII,,, | Performed by: GENERAL PRACTICE

## 2023-04-24 PROCEDURE — 3008F BODY MASS INDEX DOCD: CPT | Mod: CPTII,,, | Performed by: GENERAL PRACTICE

## 2023-04-24 PROCEDURE — 3066F NEPHROPATHY DOC TX: CPT | Mod: CPTII,,, | Performed by: GENERAL PRACTICE

## 2023-04-24 PROCEDURE — 4010F ACE/ARB THERAPY RXD/TAKEN: CPT | Mod: CPTII,,, | Performed by: GENERAL PRACTICE

## 2023-04-24 PROCEDURE — 3075F SYST BP GE 130 - 139MM HG: CPT | Mod: CPTII,,, | Performed by: GENERAL PRACTICE

## 2023-04-24 PROCEDURE — 1160F RVW MEDS BY RX/DR IN RCRD: CPT | Mod: CPTII,,, | Performed by: GENERAL PRACTICE

## 2023-04-24 PROCEDURE — 3080F DIAST BP >= 90 MM HG: CPT | Mod: CPTII,,, | Performed by: GENERAL PRACTICE

## 2023-04-24 PROCEDURE — 3061F PR NEG MICROALBUMINURIA RESULT DOCUMENTED/REVIEW: ICD-10-PCS | Mod: CPTII,,, | Performed by: GENERAL PRACTICE

## 2023-04-24 PROCEDURE — 99213 PR OFFICE/OUTPT VISIT, EST, LEVL III, 20-29 MIN: ICD-10-PCS | Mod: ,,, | Performed by: GENERAL PRACTICE

## 2023-04-24 PROCEDURE — 4010F PR ACE/ARB THEARPY RXD/TAKEN: ICD-10-PCS | Mod: CPTII,,, | Performed by: GENERAL PRACTICE

## 2023-04-24 RX ORDER — OLMESARTAN MEDOXOMIL 40 MG/1
40 TABLET ORAL DAILY
Qty: 90 TABLET | Refills: 3 | Status: SHIPPED | OUTPATIENT
Start: 2023-04-24 | End: 2024-04-23

## 2023-04-24 RX ORDER — CARVEDILOL 25 MG/1
50 TABLET ORAL 2 TIMES DAILY
Qty: 360 TABLET | Refills: 3 | Status: SHIPPED | OUTPATIENT
Start: 2023-04-24

## 2023-04-24 RX ORDER — CHLORTHALIDONE 25 MG/1
25 TABLET ORAL DAILY
Qty: 90 TABLET | Refills: 3 | Status: SHIPPED | OUTPATIENT
Start: 2023-04-24

## 2023-04-24 RX ORDER — PIOGLITAZONEHYDROCHLORIDE 45 MG/1
45 TABLET ORAL DAILY
Qty: 90 TABLET | Refills: 3 | Status: SHIPPED | OUTPATIENT
Start: 2023-04-24 | End: 2024-04-23

## 2023-04-24 RX ORDER — METFORMIN HYDROCHLORIDE 500 MG/1
500 TABLET ORAL 2 TIMES DAILY
COMMUNITY
Start: 2022-12-06

## 2023-04-24 RX ORDER — AMLODIPINE BESYLATE 10 MG/1
10 TABLET ORAL DAILY
Qty: 90 TABLET | Refills: 3 | Status: SHIPPED | OUTPATIENT
Start: 2023-04-24

## 2023-04-24 RX ORDER — GLIPIZIDE 10 MG/1
10 TABLET, FILM COATED, EXTENDED RELEASE ORAL 2 TIMES DAILY
Qty: 180 TABLET | Refills: 3 | Status: SHIPPED | OUTPATIENT
Start: 2023-04-24

## 2023-04-24 NOTE — ASSESSMENT & PLAN NOTE
Prescribed ezetimibe 10 mg daily.  Most recent cholesterol/lipid blood testing shows adequate control, continue current treatment plan, including prescription medications if prescribed, and/or diet high in fiber, low in saturated fats as discussed during clinic visit.

## 2023-04-24 NOTE — PROGRESS NOTES
"Subjective:       Patient ID: Humphrey Gallegos is a 47 y.o. male.    Chief Complaint: No chief complaint on file.    Patient presents to the clinic today for chronic condition follow-up.  Hypertension, diabetes with hyperglycemia, previous wellness exam last September showed an A1c of 8.5%, scheduled for follow-up in December, did not make that appointment, most recent A1c approximately six days ago 8.1%.    Last wellness exam was 09/30/2022.      Review of Systems   Constitutional: Negative.  Negative for fatigue and fever.   HENT: Negative.  Negative for sore throat and trouble swallowing.    Eyes: Negative.  Negative for redness and visual disturbance.   Respiratory: Negative.  Negative for chest tightness and shortness of breath.    Cardiovascular: Negative.  Negative for chest pain.   Gastrointestinal: Negative.  Negative for abdominal pain, blood in stool and nausea.   Endocrine: Negative.  Negative for cold intolerance, heat intolerance and polyuria.   Genitourinary: Negative.    Musculoskeletal: Negative.  Negative for arthralgias, gait problem and joint swelling.   Integumentary:  Negative for rash. Negative.   Neurological: Negative.  Negative for dizziness, weakness and headaches.   Psychiatric/Behavioral: Negative.  Negative for agitation and dysphoric mood.        Objective:     Vitals:    04/24/23 0820   BP: (!) 138/90   Pulse: 79   Resp: 18   SpO2: 99%   Weight: 87.1 kg (192 lb)   Height: 5' 7" (1.702 m)   Body mass index is 30.07 kg/m².     Physical Exam  Constitutional:       Appearance: Normal appearance.   HENT:      Head: Normocephalic.      Mouth/Throat:      Pharynx: Oropharynx is clear.   Eyes:      Conjunctiva/sclera: Conjunctivae normal.      Pupils: Pupils are equal, round, and reactive to light.   Cardiovascular:      Rate and Rhythm: Normal rate and regular rhythm.      Heart sounds: Normal heart sounds.   Pulmonary:      Effort: Pulmonary effort is normal.      Breath sounds: Normal breath " sounds.   Abdominal:      General: Abdomen is flat.      Palpations: Abdomen is soft.   Musculoskeletal:         General: Normal range of motion.      Cervical back: Neck supple.   Skin:     General: Skin is warm and dry.   Neurological:      General: No focal deficit present.      Mental Status: He is oriented to person, place, and time.   Psychiatric:         Mood and Affect: Mood normal.         Behavior: Behavior normal.         Thought Content: Thought content normal.         Judgment: Judgment normal.         Lab Results   Component Value Date     09/28/2022    K 3.5 09/28/2022    CO2 28 09/28/2022    BUN 16.1 09/28/2022    CREATININE 1.18 09/28/2022    CALCIUM 9.8 09/28/2022    ALBUMIN 4.4 09/28/2022    BILITOT 0.6 09/28/2022    ALKPHOS 68 09/28/2022    AST 22 09/28/2022    ALT 27 09/28/2022    EGFRNORACEVR >60 09/28/2022     Lab Results   Component Value Date    WBC 5.0 09/28/2022    RBC 5.28 09/28/2022    HGB 15.6 09/28/2022    HCT 46.5 09/28/2022    MCV 88.1 09/28/2022    RDW 12.3 09/28/2022     09/28/2022      Lab Results   Component Value Date    CHOL 206 (H) 09/28/2022    TRIG 206 (H) 09/28/2022    HDL 58 09/28/2022    .00 09/28/2022    TOTALCHOLEST 4 09/28/2022     Lab Results   Component Value Date    TSH 0.7399 09/28/2022     Lab Results   Component Value Date    HGBA1C 8.1 (H) 04/19/2023        Lab Results   Component Value Date    PSA 0.46 09/28/2022         Assessment:     Problem List Items Addressed This Visit          Psychiatric    Generalized anxiety disorder (Chronic)    Current Assessment & Plan     Prescribed Zoloft 50 mg daily.  Patient reports stability of moods, and effectiveness of medications, including no agitation, significant somnolence, or significant bouts of anxiety or depression.  Patient is not having any sleep disturbance.  Current treatment plan will continue, with plans to discuss any significant changes in patient's status if necessary if anything  "changes in the future.              Cardiac/Vascular    Primary hypertension (Chronic)    Current Assessment & Plan     Prescribed olmesartan 40 mg daily, Coreg 50 mg b.i.d., amlodipine 10 mg, hydralazine 50 mg b.i.d., chlorthalidone 25 mg daily.  Questionable control, need home blood pressures, will bring those at his next visit in three months.           Relevant Medications    olmesartan (BENICAR) 40 MG tablet    carvediloL (COREG) 25 MG tablet    amLODIPine (NORVASC) 10 MG tablet    Dyslipidemia (Chronic)    Current Assessment & Plan     Prescribed ezetimibe 10 mg daily.  Most recent cholesterol/lipid blood testing shows adequate control, continue current treatment plan, including prescription medications if prescribed, and/or diet high in fiber, low in saturated fats as discussed during clinic visit.              Endocrine    Class 1 obesity due to excess calories with serious comorbidity and body mass index (BMI) of 30.0 to 30.9 in adult (Chronic)    Current Assessment & Plan     Weight loss, healthy dietary choices, increased activity/exercise are recommended.  To lose weight, it is generally recommended to restrict calories to approximately 1500 calories per day to lose 1 lb per week, and approximately 1200 calories per day to lose up to 2 lb per week.  Generally, this is a healthy amount of weight to lose, and an appropriate amount of time to lose this amount of weight.  Adding exercise will assist in losing weight, particularly aerobic exercise such as walking.  However, resistance training, or lifting weights" over time may assistant weight loss by increasing basal metabolic rate, or the rate at which your body burns calories during periods of inactivity.    Keep track of BMI (body mass index), below 25 is considered normal, over 25 but below 30 is considered overweight, anything over 30 is considered moderately obese, over 35 severely obese, and over 40 is characterized as morbidly obese.           Type " 2 diabetes mellitus with hyperglycemia, without long-term current use of insulin (Chronic)    Current Assessment & Plan     Prescribed Farxiga 10 mg daily, glipizide 10 mg daily, metformin extended release 500 mg twice daily, Actos 45 mg daily.  A1c within the last six days 8.1%.  Would like to modify his diet to get his number below 8%, we will defer changing his medication for now           Relevant Medications    metFORMIN (GLUCOPHAGE) 500 MG tablet    pioglitazone (ACTOS) 45 MG tablet    chlorthalidone (HYGROTEN) 25 MG Tab    glipiZIDE (GLUCOTROL) 10 MG TR24    Other Relevant Orders    Hemoglobin A1C          Medication List with Changes/Refills   Current Medications    BLOOD-GLUCOSE METER KIT      ACCU-CHEK GUIDE TEST STRIPS 100S, See Instructions, USE TO TEST BLOOD GLUCOSE ONCE DAILY AS DIRECTED, # 100 unknown unit, 2 Refill(s), Pharmacy: [facility] #[id number], 169, cm, Height/Length Dosing, 12/03/20 14:03:00 CST, 88.5, kg, Weight Dosing...    FARXIGA 10 MG TABLET    Take 10 mg by mouth once daily.    HYDRALAZINE (APRESOLINE) 50 MG TABLET    Take 50 mg by mouth 2 (two) times daily.    METFORMIN (GLUCOPHAGE) 500 MG TABLET    Take 500 mg by mouth 2 (two) times daily.   Changed and/or Refilled Medications    Modified Medication Previous Medication    AMLODIPINE (NORVASC) 10 MG TABLET amLODIPine (NORVASC) 10 MG tablet       Take 1 tablet (10 mg total) by mouth once daily.        CARVEDILOL (COREG) 25 MG TABLET carvediloL (COREG) 25 MG tablet       Take 2 tablets (50 mg total) by mouth 2 (two) times daily.    Take 50 mg by mouth 2 (two) times daily.    CHLORTHALIDONE (HYGROTEN) 25 MG TAB chlorthalidone (HYGROTEN) 25 MG Tab       Take 1 tablet (25 mg total) by mouth once daily.    Take 1 tablet (25 mg total) by mouth once daily.    GLIPIZIDE (GLUCOTROL) 10 MG TR24 glipiZIDE (GLUCOTROL) 10 MG TR24       Take 1 tablet (10 mg total) by mouth 2 (two) times daily.    Take 1 tablet (10 mg total) by mouth once daily.     OLMESARTAN (BENICAR) 40 MG TABLET olmesartan (BENICAR) 40 MG tablet       Take 1 tablet (40 mg total) by mouth once daily.    Take 1 tablet (40 mg total) by mouth once daily.    PIOGLITAZONE (ACTOS) 45 MG TABLET pioglitazone (ACTOS) 45 MG tablet       Take 1 tablet (45 mg total) by mouth once daily.    Take 1 tablet (45 mg total) by mouth once daily.   Discontinued Medications    EZETIMIBE (ZETIA) 10 MG TABLET    Take 10 mg by mouth once daily.    METFORMIN (GLUMETZA) 500 MG (MOD) 24HR TABLET    Take 1 tablet (500 mg total) by mouth daily with breakfast.    SERTRALINE (ZOLOFT) 50 MG TABLET    Take 50 mg by mouth once daily.     Plan:             Follow up in about 3 months (around 7/27/2023) for DM F/up.

## 2023-04-24 NOTE — ASSESSMENT & PLAN NOTE
Prescribed olmesartan 40 mg daily, Coreg 50 mg b.i.d., amlodipine 10 mg, hydralazine 50 mg b.i.d., chlorthalidone 25 mg daily.  Questionable control, need home blood pressures, will bring those at his next visit in three months.

## 2023-04-24 NOTE — PATIENT INSTRUCTIONS
Follow a healthy meal plan.  This includes eating lean proteins, complex carbohydrates in moderation (rice, bread, pasta, potatoes), fresh fruits and vegetables, low-fat dairy products and healthy fats such as avocado, olive, canola or vegetable oil.  Simple carbohydrates such as sweets, containing sugar should be avoided or consumed in controlled amounts.    Physical activity as recommended, 30 more minutes up to 5 days a week.  This could be something as simple as brisk walking or biking.    Weight loss is also beneficial and may reverse diabetes or prediabetes.

## 2023-04-24 NOTE — ASSESSMENT & PLAN NOTE
Prescribed Zoloft 50 mg daily.  Patient reports stability of moods, and effectiveness of medications, including no agitation, significant somnolence, or significant bouts of anxiety or depression.  Patient is not having any sleep disturbance.  Current treatment plan will continue, with plans to discuss any significant changes in patient's status if necessary if anything changes in the future.

## 2023-05-01 ENCOUNTER — PATIENT MESSAGE (OUTPATIENT)
Dept: ADMINISTRATIVE | Facility: HOSPITAL | Age: 48
End: 2023-05-01
Payer: COMMERCIAL

## 2023-07-21 DIAGNOSIS — E11.65 TYPE 2 DIABETES MELLITUS WITH HYPERGLYCEMIA, WITHOUT LONG-TERM CURRENT USE OF INSULIN: Primary | Chronic | ICD-10-CM

## 2023-07-24 ENCOUNTER — CLINICAL SUPPORT (OUTPATIENT)
Dept: PRIMARY CARE CLINIC | Facility: CLINIC | Age: 48
End: 2023-07-24
Payer: COMMERCIAL

## 2023-07-24 DIAGNOSIS — E11.65 TYPE 2 DIABETES MELLITUS WITH HYPERGLYCEMIA, WITHOUT LONG-TERM CURRENT USE OF INSULIN: Chronic | ICD-10-CM

## 2023-07-24 LAB
EST. AVERAGE GLUCOSE BLD GHB EST-MCNC: 197.3 MG/DL
HBA1C MFR BLD: 8.5 %

## 2023-07-24 PROCEDURE — 36415 COLL VENOUS BLD VENIPUNCTURE: CPT | Mod: ,,, | Performed by: GENERAL PRACTICE

## 2023-07-24 PROCEDURE — 36415 COLL VENOUS BLD VENIPUNCTURE: CPT

## 2023-07-24 PROCEDURE — 36415 PR COLLECTION VENOUS BLOOD,VENIPUNCTURE: ICD-10-PCS | Mod: ,,, | Performed by: GENERAL PRACTICE

## 2023-07-24 PROCEDURE — 83036 HEMOGLOBIN GLYCOSYLATED A1C: CPT | Performed by: GENERAL PRACTICE

## 2023-07-24 NOTE — PROGRESS NOTES
Patient here to venipuncture/ Nurse visit labs with 22 gauge needle. Patient was drawn in right antecubital .No complications or issues occurred. Patient expressed no pain.

## 2023-07-25 ENCOUNTER — PATIENT MESSAGE (OUTPATIENT)
Dept: ADMINISTRATIVE | Facility: HOSPITAL | Age: 48
End: 2023-07-25
Payer: COMMERCIAL

## 2023-08-21 NOTE — ASSESSMENT & PLAN NOTE
Prescribed glipizide XL 10 mg daily, metformin 500 mg b.i.d., Actos 45 mg daily, Farxiga 10 mg daily.    Component Ref Range & Units 4 wk ago  (7/24/23) 4 mo ago  (4/19/23) 10 mo ago  (9/28/22) 1 yr ago  (3/25/22) 1 yr ago  (9/9/21) 2 yr ago  (12/2/20) 3 yr ago  (6/1/20)   Hemoglobin A1c <=7.0 % 8.5 High   8.1 High   8.5 High   8.5 R  7.8 High  R  7.4 High  R  7.8 High  R    Estimated Average Glucose mg/dL 197.3  185.8  197.3  197.2 R  177.2  165.7  177.2      Most recent A1c 8.5%, diabetes still not adequately controlled.  We will start Januvia 25 mg daily.  Dietary changes stressed, he does state that he more than likely consumes more carbs than he should.  We might consider dietary counseling in the future.

## 2023-08-21 NOTE — PROGRESS NOTES
"Subjective:       Patient ID: Humphrey Gallegos is a 48 y.o. male.    Chief Complaint: Follow-up (DM follow up/A1C done 07/24)    Established patient, returns for chronic condition/diabetes follow-up.  A1c still elevated at 8.5%, not improved from last check three months ago.  Could do better on his diet, compliant with medications.  Strong familial history of diabetes, his father.    Last wellness exam was 09/30/2022.        Review of Systems   Constitutional: Negative.  Negative for fatigue and fever.   HENT: Negative.  Negative for sore throat and trouble swallowing.    Eyes: Negative.  Negative for redness and visual disturbance.   Respiratory: Negative.  Negative for chest tightness and shortness of breath.    Cardiovascular: Negative.  Negative for chest pain.   Gastrointestinal: Negative.  Negative for abdominal pain, blood in stool and nausea.   Endocrine: Negative.  Negative for cold intolerance, heat intolerance and polyuria.   Genitourinary: Negative.    Musculoskeletal: Negative.  Negative for arthralgias, gait problem and joint swelling.   Integumentary:  Negative for rash. Negative.   Neurological: Negative.  Negative for dizziness, weakness and headaches.   Psychiatric/Behavioral: Negative.  Negative for agitation and dysphoric mood.          Objective:   Visit Vitals  /85   Pulse 66   Ht 5' 7" (1.702 m)   Wt 85.5 kg (188 lb 6.4 oz)   SpO2 98%   BMI 29.51 kg/m²        Physical Exam  Constitutional:       Appearance: He is obese.   HENT:      Head: Normocephalic.   Eyes:      Conjunctiva/sclera: Conjunctivae normal.   Cardiovascular:      Rate and Rhythm: Normal rate and regular rhythm.   Pulmonary:      Effort: Pulmonary effort is normal.      Breath sounds: Normal breath sounds.   Abdominal:      Palpations: Abdomen is soft.   Musculoskeletal:         General: Normal range of motion.   Skin:     General: Skin is warm and dry.   Neurological:      General: No focal deficit present.      Mental Status: " He is alert. Mental status is at baseline.   Psychiatric:         Mood and Affect: Mood normal.         Behavior: Behavior normal.         Thought Content: Thought content normal.           Lab Results   Component Value Date     09/28/2022    K 3.5 09/28/2022    CO2 28 09/28/2022    BUN 16.1 09/28/2022    CREATININE 1.18 09/28/2022    CALCIUM 9.8 09/28/2022    ALBUMIN 4.4 09/28/2022    BILITOT 0.6 09/28/2022    ALKPHOS 68 09/28/2022    AST 22 09/28/2022    ALT 27 09/28/2022    EGFRNORACEVR >60 09/28/2022     Lab Results   Component Value Date    WBC 5.0 09/28/2022    RBC 5.28 09/28/2022    HGB 15.6 09/28/2022    HCT 46.5 09/28/2022    MCV 88.1 09/28/2022    RDW 12.3 09/28/2022     09/28/2022      Lab Results   Component Value Date    CHOL 206 (H) 09/28/2022    TRIG 206 (H) 09/28/2022    HDL 58 09/28/2022    .00 09/28/2022    TOTALCHOLEST 4 09/28/2022     Lab Results   Component Value Date    TSH 0.7399 09/28/2022     Lab Results   Component Value Date    HGBA1C 8.5 (H) 07/24/2023        Lab Results   Component Value Date    PSA 0.46 09/28/2022         Assessment:     Problem List Items Addressed This Visit          Cardiac/Vascular    Primary hypertension (Chronic)    Current Assessment & Plan     Prescribed olmesartan 40 mg daily, Coreg 50 mg b.i.d., amlodipine 10 mg, hydralazine 50 mg b.i.d., chlorthalidone 25 mg daily.  Questionable control, need home blood pressures, will bring those at his next visit in three months.            Endocrine    Type 2 diabetes mellitus with hyperglycemia, without long-term current use of insulin - Primary (Chronic)    Current Assessment & Plan     Prescribed glipizide XL 10 mg daily, metformin 500 mg b.i.d., Actos 45 mg daily, Farxiga 10 mg daily.    Component Ref Range & Units 4 wk ago  (7/24/23) 4 mo ago  (4/19/23) 10 mo ago  (9/28/22) 1 yr ago  (3/25/22) 1 yr ago  (9/9/21) 2 yr ago  (12/2/20) 3 yr ago  (6/1/20)   Hemoglobin A1c <=7.0 % 8.5 High   8.1 High    8.5 High   8.5 R  7.8 High  R  7.4 High  R  7.8 High  R    Estimated Average Glucose mg/dL 197.3  185.8  197.3  197.2 R  177.2  165.7  177.2      Most recent A1c 8.5%, diabetes still not adequately controlled.  We will start Januvia 25 mg daily.  Dietary changes stressed, he does state that he more than likely consumes more carbs than he should.  We might consider dietary counseling in the future.         Relevant Medications    SITagliptin phosphate (JANUVIA) 25 MG Tab     Other Visit Diagnoses       Screening for colon cancer        Relevant Orders    Ambulatory referral/consult to Gastroenterology    Wellness examination        This diagnosis does not apply to this visit, wellness exam with pre visit labs will be scheduled/ordered for future visit.    Relevant Orders    Comprehensive Metabolic Panel    CBC Auto Differential    Lipid Panel    Hemoglobin A1C    TSH    PSA, Screening    Screening for prostate cancer        This diagnosis does not apply to this visit, wellness exam with pre visit labs will be scheduled/ordered for future visit.    Relevant Orders    PSA, Screening               Current Outpatient Medications   Medication Instructions    amLODIPine (NORVASC) 10 mg, Oral, Daily    blood-glucose meter kit   ACCU-CHEK GUIDE TEST STRIPS 100S, See Instructions, USE TO TEST BLOOD GLUCOSE ONCE DAILY AS DIRECTED, # 100 unknown unit, 2 Refill(s), Pharmacy: [id number], 169, cm, Height/Length Dosing, 12/03/20 14:03:00 CST, 88.5, kg, Weight Dosing...    carvediloL (COREG) 50 mg, Oral, 2 times daily    chlorthalidone (HYGROTEN) 25 mg, Oral, Daily    FARXIGA 10 mg, Oral, Daily    glipiZIDE (GLUCOTROL) 10 mg, Oral, 2 times daily    hydrALAZINE (APRESOLINE) 50 mg, Oral, 2 times daily    metFORMIN (GLUCOPHAGE) 500 mg, Oral, 2 times daily    olmesartan (BENICAR) 40 mg, Oral, Daily    pioglitazone (ACTOS) 45 mg, Oral, Daily    SITagliptin phosphate (JANUVIA) 25 mg, Oral, Daily     Plan:              Follow up in about 3 months (around 11/29/2023) for Wellness.

## 2023-08-22 ENCOUNTER — OFFICE VISIT (OUTPATIENT)
Dept: PRIMARY CARE CLINIC | Facility: CLINIC | Age: 48
End: 2023-08-22
Payer: COMMERCIAL

## 2023-08-22 VITALS
SYSTOLIC BLOOD PRESSURE: 134 MMHG | OXYGEN SATURATION: 98 % | DIASTOLIC BLOOD PRESSURE: 85 MMHG | HEIGHT: 67 IN | BODY MASS INDEX: 29.57 KG/M2 | WEIGHT: 188.38 LBS | HEART RATE: 66 BPM

## 2023-08-22 DIAGNOSIS — Z12.5 SCREENING FOR PROSTATE CANCER: ICD-10-CM

## 2023-08-22 DIAGNOSIS — E11.65 TYPE 2 DIABETES MELLITUS WITH HYPERGLYCEMIA, WITHOUT LONG-TERM CURRENT USE OF INSULIN: Primary | Chronic | ICD-10-CM

## 2023-08-22 DIAGNOSIS — Z12.11 SCREENING FOR COLON CANCER: ICD-10-CM

## 2023-08-22 DIAGNOSIS — Z00.00 WELLNESS EXAMINATION: ICD-10-CM

## 2023-08-22 DIAGNOSIS — I10 PRIMARY HYPERTENSION: Chronic | ICD-10-CM

## 2023-08-22 PROCEDURE — 3008F BODY MASS INDEX DOCD: CPT | Mod: CPTII,,, | Performed by: GENERAL PRACTICE

## 2023-08-22 PROCEDURE — 3061F NEG MICROALBUMINURIA REV: CPT | Mod: CPTII,,, | Performed by: GENERAL PRACTICE

## 2023-08-22 PROCEDURE — 99214 OFFICE O/P EST MOD 30 MIN: CPT | Mod: ,,, | Performed by: GENERAL PRACTICE

## 2023-08-22 PROCEDURE — 99214 PR OFFICE/OUTPT VISIT, EST, LEVL IV, 30-39 MIN: ICD-10-PCS | Mod: ,,, | Performed by: GENERAL PRACTICE

## 2023-08-22 PROCEDURE — 1159F MED LIST DOCD IN RCRD: CPT | Mod: CPTII,,, | Performed by: GENERAL PRACTICE

## 2023-08-22 PROCEDURE — 3066F NEPHROPATHY DOC TX: CPT | Mod: CPTII,,, | Performed by: GENERAL PRACTICE

## 2023-08-22 PROCEDURE — 3079F DIAST BP 80-89 MM HG: CPT | Mod: CPTII,,, | Performed by: GENERAL PRACTICE

## 2023-08-22 PROCEDURE — 4010F ACE/ARB THERAPY RXD/TAKEN: CPT | Mod: CPTII,,, | Performed by: GENERAL PRACTICE

## 2023-08-22 PROCEDURE — 3079F PR MOST RECENT DIASTOLIC BLOOD PRESSURE 80-89 MM HG: ICD-10-PCS | Mod: CPTII,,, | Performed by: GENERAL PRACTICE

## 2023-08-22 PROCEDURE — 1160F RVW MEDS BY RX/DR IN RCRD: CPT | Mod: CPTII,,, | Performed by: GENERAL PRACTICE

## 2023-08-22 PROCEDURE — 3008F PR BODY MASS INDEX (BMI) DOCUMENTED: ICD-10-PCS | Mod: CPTII,,, | Performed by: GENERAL PRACTICE

## 2023-08-22 PROCEDURE — 3052F HG A1C>EQUAL 8.0%<EQUAL 9.0%: CPT | Mod: CPTII,,, | Performed by: GENERAL PRACTICE

## 2023-08-22 PROCEDURE — 3075F PR MOST RECENT SYSTOLIC BLOOD PRESS GE 130-139MM HG: ICD-10-PCS | Mod: CPTII,,, | Performed by: GENERAL PRACTICE

## 2023-08-22 PROCEDURE — 1159F PR MEDICATION LIST DOCUMENTED IN MEDICAL RECORD: ICD-10-PCS | Mod: CPTII,,, | Performed by: GENERAL PRACTICE

## 2023-08-22 PROCEDURE — 3061F PR NEG MICROALBUMINURIA RESULT DOCUMENTED/REVIEW: ICD-10-PCS | Mod: CPTII,,, | Performed by: GENERAL PRACTICE

## 2023-08-22 PROCEDURE — 4010F PR ACE/ARB THEARPY RXD/TAKEN: ICD-10-PCS | Mod: CPTII,,, | Performed by: GENERAL PRACTICE

## 2023-08-22 PROCEDURE — 3052F PR MOST RECENT HEMOGLOBIN A1C LEVEL 8.0 - < 9.0%: ICD-10-PCS | Mod: CPTII,,, | Performed by: GENERAL PRACTICE

## 2023-08-22 PROCEDURE — 3075F SYST BP GE 130 - 139MM HG: CPT | Mod: CPTII,,, | Performed by: GENERAL PRACTICE

## 2023-08-22 PROCEDURE — 1160F PR REVIEW ALL MEDS BY PRESCRIBER/CLIN PHARMACIST DOCUMENTED: ICD-10-PCS | Mod: CPTII,,, | Performed by: GENERAL PRACTICE

## 2023-08-22 PROCEDURE — 3066F PR DOCUMENTATION OF TREATMENT FOR NEPHROPATHY: ICD-10-PCS | Mod: CPTII,,, | Performed by: GENERAL PRACTICE

## 2023-09-29 ENCOUNTER — DOCUMENTATION ONLY (OUTPATIENT)
Dept: PRIMARY CARE CLINIC | Facility: CLINIC | Age: 48
End: 2023-09-29
Payer: COMMERCIAL

## 2023-10-11 LAB
LEFT EYE DM RETINOPATHY: NEGATIVE
RIGHT EYE DM RETINOPATHY: NEGATIVE

## 2023-10-12 ENCOUNTER — DOCUMENTATION ONLY (OUTPATIENT)
Dept: PRIMARY CARE CLINIC | Facility: CLINIC | Age: 48
End: 2023-10-12
Payer: COMMERCIAL

## 2023-11-27 ENCOUNTER — CLINICAL SUPPORT (OUTPATIENT)
Dept: PRIMARY CARE CLINIC | Facility: CLINIC | Age: 48
End: 2023-11-27
Payer: COMMERCIAL

## 2023-11-27 DIAGNOSIS — Z12.5 SCREENING FOR PROSTATE CANCER: ICD-10-CM

## 2023-11-27 DIAGNOSIS — E11.65 TYPE 2 DIABETES MELLITUS WITH HYPERGLYCEMIA, WITHOUT LONG-TERM CURRENT USE OF INSULIN: Primary | Chronic | ICD-10-CM

## 2023-11-27 DIAGNOSIS — Z00.00 WELLNESS EXAMINATION: ICD-10-CM

## 2023-11-27 LAB
ALBUMIN SERPL-MCNC: 4.2 G/DL (ref 3.5–5)
ALBUMIN/GLOB SERPL: 1.4 RATIO (ref 1.1–2)
ALP SERPL-CCNC: 62 UNIT/L (ref 40–150)
ALT SERPL-CCNC: 20 UNIT/L (ref 0–55)
AST SERPL-CCNC: 19 UNIT/L (ref 5–34)
BASOPHILS # BLD AUTO: 0.02 X10(3)/MCL
BASOPHILS NFR BLD AUTO: 0.4 %
BILIRUB SERPL-MCNC: 0.6 MG/DL
BUN SERPL-MCNC: 22.1 MG/DL (ref 8.9–20.6)
CALCIUM SERPL-MCNC: 9.6 MG/DL (ref 8.4–10.2)
CHLORIDE SERPL-SCNC: 100 MMOL/L (ref 98–107)
CHOLEST SERPL-MCNC: 239 MG/DL
CHOLEST/HDLC SERPL: 4 {RATIO} (ref 0–5)
CO2 SERPL-SCNC: 27 MMOL/L (ref 22–29)
CREAT SERPL-MCNC: 1.1 MG/DL (ref 0.73–1.18)
EOSINOPHIL # BLD AUTO: 0.19 X10(3)/MCL (ref 0–0.9)
EOSINOPHIL NFR BLD AUTO: 3.4 %
ERYTHROCYTE [DISTWIDTH] IN BLOOD BY AUTOMATED COUNT: 13.2 % (ref 11.5–17)
EST. AVERAGE GLUCOSE BLD GHB EST-MCNC: 191.5 MG/DL
GFR SERPLBLD CREATININE-BSD FMLA CKD-EPI: >60 MLS/MIN/1.73/M2
GLOBULIN SER-MCNC: 3 GM/DL (ref 2.4–3.5)
GLUCOSE SERPL-MCNC: 244 MG/DL (ref 74–100)
HBA1C MFR BLD: 8.3 %
HCT VFR BLD AUTO: 47.3 % (ref 42–52)
HDLC SERPL-MCNC: 59 MG/DL (ref 35–60)
HGB BLD-MCNC: 15.9 G/DL (ref 14–18)
IMM GRANULOCYTES # BLD AUTO: 0.01 X10(3)/MCL (ref 0–0.04)
IMM GRANULOCYTES NFR BLD AUTO: 0.2 %
LDLC SERPL CALC-MCNC: 136 MG/DL (ref 50–140)
LYMPHOCYTES # BLD AUTO: 1.19 X10(3)/MCL (ref 0.6–4.6)
LYMPHOCYTES NFR BLD AUTO: 21.3 %
MCH RBC QN AUTO: 29.2 PG (ref 27–31)
MCHC RBC AUTO-ENTMCNC: 33.6 G/DL (ref 33–36)
MCV RBC AUTO: 86.9 FL (ref 80–94)
MONOCYTES # BLD AUTO: 0.59 X10(3)/MCL (ref 0.1–1.3)
MONOCYTES NFR BLD AUTO: 10.6 %
NEUTROPHILS # BLD AUTO: 3.58 X10(3)/MCL (ref 2.1–9.2)
NEUTROPHILS NFR BLD AUTO: 64.1 %
NRBC BLD AUTO-RTO: 0 %
PLATELET # BLD AUTO: 156 X10(3)/MCL (ref 130–400)
PMV BLD AUTO: 11.5 FL (ref 7.4–10.4)
POTASSIUM SERPL-SCNC: 4.2 MMOL/L (ref 3.5–5.1)
PROT SERPL-MCNC: 7.2 GM/DL (ref 6.4–8.3)
PSA SERPL-MCNC: 0.5 NG/ML
RBC # BLD AUTO: 5.44 X10(6)/MCL (ref 4.7–6.1)
SODIUM SERPL-SCNC: 136 MMOL/L (ref 136–145)
TRIGL SERPL-MCNC: 218 MG/DL (ref 34–140)
TSH SERPL-ACNC: 0.82 UIU/ML (ref 0.35–4.94)
VLDLC SERPL CALC-MCNC: 44 MG/DL
WBC # SPEC AUTO: 5.58 X10(3)/MCL (ref 4.5–11.5)

## 2023-11-27 PROCEDURE — 36415 COLL VENOUS BLD VENIPUNCTURE: CPT | Mod: ,,, | Performed by: GENERAL PRACTICE

## 2023-11-27 PROCEDURE — 36415 COLL VENOUS BLD VENIPUNCTURE: CPT

## 2023-11-27 PROCEDURE — 36415 PR COLLECTION VENOUS BLOOD,VENIPUNCTURE: ICD-10-PCS | Mod: ,,, | Performed by: GENERAL PRACTICE

## 2023-11-27 NOTE — PROGRESS NOTES
Patient verified name and .Patient here for nurse visit to draw AW labs with (22- 21) gauge needle. Patient was drawn in right/left antecubital .No complications or issues occurred. Patient tolerated venipuncture well. Patient expressed no questions or concerns.[facility] LOPEZ filiberto labs

## 2023-11-29 ENCOUNTER — OFFICE VISIT (OUTPATIENT)
Dept: PRIMARY CARE CLINIC | Facility: CLINIC | Age: 48
End: 2023-11-29
Payer: COMMERCIAL

## 2023-11-29 VITALS
SYSTOLIC BLOOD PRESSURE: 135 MMHG | RESPIRATION RATE: 18 BRPM | WEIGHT: 187 LBS | OXYGEN SATURATION: 99 % | BODY MASS INDEX: 29.35 KG/M2 | HEART RATE: 63 BPM | DIASTOLIC BLOOD PRESSURE: 86 MMHG | HEIGHT: 67 IN

## 2023-11-29 DIAGNOSIS — E78.5 DYSLIPIDEMIA: Chronic | ICD-10-CM

## 2023-11-29 DIAGNOSIS — Z78.9 STATIN INTOLERANCE: Chronic | ICD-10-CM

## 2023-11-29 DIAGNOSIS — E66.09 CLASS 1 OBESITY DUE TO EXCESS CALORIES WITH SERIOUS COMORBIDITY AND BODY MASS INDEX (BMI) OF 30.0 TO 30.9 IN ADULT: Chronic | ICD-10-CM

## 2023-11-29 DIAGNOSIS — E11.65 TYPE 2 DIABETES MELLITUS WITH HYPERGLYCEMIA, WITHOUT LONG-TERM CURRENT USE OF INSULIN: Chronic | ICD-10-CM

## 2023-11-29 DIAGNOSIS — Z12.5 SCREENING FOR PROSTATE CANCER: ICD-10-CM

## 2023-11-29 DIAGNOSIS — I10 PRIMARY HYPERTENSION: Chronic | ICD-10-CM

## 2023-11-29 DIAGNOSIS — Z00.00 WELLNESS EXAMINATION: Primary | ICD-10-CM

## 2023-11-29 PROCEDURE — 3052F HG A1C>EQUAL 8.0%<EQUAL 9.0%: CPT | Mod: CPTII,,, | Performed by: GENERAL PRACTICE

## 2023-11-29 PROCEDURE — 1160F RVW MEDS BY RX/DR IN RCRD: CPT | Mod: CPTII,,, | Performed by: GENERAL PRACTICE

## 2023-11-29 PROCEDURE — 1160F PR REVIEW ALL MEDS BY PRESCRIBER/CLIN PHARMACIST DOCUMENTED: ICD-10-PCS | Mod: CPTII,,, | Performed by: GENERAL PRACTICE

## 2023-11-29 PROCEDURE — 3079F DIAST BP 80-89 MM HG: CPT | Mod: CPTII,,, | Performed by: GENERAL PRACTICE

## 2023-11-29 PROCEDURE — 3008F PR BODY MASS INDEX (BMI) DOCUMENTED: ICD-10-PCS | Mod: CPTII,,, | Performed by: GENERAL PRACTICE

## 2023-11-29 PROCEDURE — 3008F BODY MASS INDEX DOCD: CPT | Mod: CPTII,,, | Performed by: GENERAL PRACTICE

## 2023-11-29 PROCEDURE — 3066F NEPHROPATHY DOC TX: CPT | Mod: CPTII,,, | Performed by: GENERAL PRACTICE

## 2023-11-29 PROCEDURE — 99396 PR PREVENTIVE VISIT,EST,40-64: ICD-10-PCS | Mod: ,,, | Performed by: GENERAL PRACTICE

## 2023-11-29 PROCEDURE — 1159F MED LIST DOCD IN RCRD: CPT | Mod: CPTII,,, | Performed by: GENERAL PRACTICE

## 2023-11-29 PROCEDURE — 3061F NEG MICROALBUMINURIA REV: CPT | Mod: CPTII,,, | Performed by: GENERAL PRACTICE

## 2023-11-29 PROCEDURE — 4010F PR ACE/ARB THEARPY RXD/TAKEN: ICD-10-PCS | Mod: CPTII,,, | Performed by: GENERAL PRACTICE

## 2023-11-29 PROCEDURE — 99396 PREV VISIT EST AGE 40-64: CPT | Mod: ,,, | Performed by: GENERAL PRACTICE

## 2023-11-29 PROCEDURE — 3052F PR MOST RECENT HEMOGLOBIN A1C LEVEL 8.0 - < 9.0%: ICD-10-PCS | Mod: CPTII,,, | Performed by: GENERAL PRACTICE

## 2023-11-29 PROCEDURE — 3079F PR MOST RECENT DIASTOLIC BLOOD PRESSURE 80-89 MM HG: ICD-10-PCS | Mod: CPTII,,, | Performed by: GENERAL PRACTICE

## 2023-11-29 PROCEDURE — 3061F PR NEG MICROALBUMINURIA RESULT DOCUMENTED/REVIEW: ICD-10-PCS | Mod: CPTII,,, | Performed by: GENERAL PRACTICE

## 2023-11-29 PROCEDURE — 4010F ACE/ARB THERAPY RXD/TAKEN: CPT | Mod: CPTII,,, | Performed by: GENERAL PRACTICE

## 2023-11-29 PROCEDURE — 1159F PR MEDICATION LIST DOCUMENTED IN MEDICAL RECORD: ICD-10-PCS | Mod: CPTII,,, | Performed by: GENERAL PRACTICE

## 2023-11-29 PROCEDURE — 3075F PR MOST RECENT SYSTOLIC BLOOD PRESS GE 130-139MM HG: ICD-10-PCS | Mod: CPTII,,, | Performed by: GENERAL PRACTICE

## 2023-11-29 PROCEDURE — 3066F PR DOCUMENTATION OF TREATMENT FOR NEPHROPATHY: ICD-10-PCS | Mod: CPTII,,, | Performed by: GENERAL PRACTICE

## 2023-11-29 PROCEDURE — 3075F SYST BP GE 130 - 139MM HG: CPT | Mod: CPTII,,, | Performed by: GENERAL PRACTICE

## 2023-11-29 RX ORDER — HYDRALAZINE HYDROCHLORIDE 50 MG/1
50 TABLET, FILM COATED ORAL 2 TIMES DAILY
Qty: 180 TABLET | Refills: 3 | Status: SHIPPED | OUTPATIENT
Start: 2023-11-29 | End: 2024-11-28

## 2023-11-29 RX ORDER — TIRZEPATIDE 2.5 MG/.5ML
2.5 INJECTION, SOLUTION SUBCUTANEOUS
Qty: 4 PEN | Refills: 11 | Status: SHIPPED | OUTPATIENT
Start: 2023-11-29 | End: 2024-03-12

## 2023-11-29 RX ORDER — ROSUVASTATIN CALCIUM 10 MG/1
10 TABLET, COATED ORAL DAILY
Qty: 30 TABLET | Refills: 11 | Status: SHIPPED | OUTPATIENT
Start: 2023-11-29 | End: 2024-11-28

## 2023-11-29 NOTE — PROGRESS NOTES
"Subjective:       Patient ID: Humhprey Gallegos is a 48 y.o. male.    Chief Complaint: Annual Exam    Patient presents to the clinic today for wellness examination.  Current and past medical history reviewed  Pertinent family and social history reviewed    Colorectal cancer screening:  CRC screening reviewed  Prostate CA screening:  Prostate CA screening reviewed  Vaccinations due:  All vaccinations due and/or desired have been addressed and given.    No complaints today.        Review of Systems   Constitutional: Negative.  Negative for fatigue and fever.   HENT: Negative.  Negative for sore throat and trouble swallowing.    Eyes: Negative.  Negative for redness and visual disturbance.   Respiratory: Negative.  Negative for chest tightness and shortness of breath.    Cardiovascular: Negative.  Negative for chest pain.   Gastrointestinal: Negative.  Negative for abdominal pain, blood in stool and nausea.   Endocrine: Negative.  Negative for cold intolerance, heat intolerance and polyuria.   Genitourinary: Negative.    Musculoskeletal: Negative.  Negative for arthralgias, gait problem and joint swelling.   Integumentary:  Negative for rash. Negative.   Neurological: Negative.  Negative for dizziness, weakness and headaches.   Psychiatric/Behavioral: Negative.  Negative for agitation and dysphoric mood.            Patient Care Team:  Michael Sherman MD as PCP - General (Family Medicine)  Thaddeus Williamson DO as Consulting Physician (Cardiology)  Tim Torres OD (Optometry)  Ruth Ng LPN as Care Coordinator  Objective:   Visit Vitals  /86   Pulse 63   Resp 18   Ht 5' 7" (1.702 m)   Wt 84.8 kg (187 lb)   SpO2 99%   BMI 29.29 kg/m²        Physical Exam  Constitutional:       Appearance: Normal appearance.   HENT:      Head: Normocephalic.      Mouth/Throat:      Mouth: Mucous membranes are moist.      Pharynx: Oropharynx is clear.   Eyes:      Conjunctiva/sclera: Conjunctivae normal.      Pupils: Pupils are " equal, round, and reactive to light.   Cardiovascular:      Rate and Rhythm: Normal rate and regular rhythm.      Heart sounds: Normal heart sounds.   Pulmonary:      Effort: Pulmonary effort is normal.      Breath sounds: Normal breath sounds.   Abdominal:      General: Abdomen is flat.      Palpations: Abdomen is soft.   Musculoskeletal:         General: Normal range of motion.      Cervical back: Neck supple.   Skin:     General: Skin is warm and dry.   Neurological:      General: No focal deficit present.      Mental Status: He is alert and oriented to person, place, and time.   Psychiatric:         Mood and Affect: Mood normal.         Behavior: Behavior normal.         Thought Content: Thought content normal.         Judgment: Judgment normal.           Lab Results   Component Value Date     11/27/2023    K 4.2 11/27/2023    CO2 27 11/27/2023    BUN 22.1 (H) 11/27/2023    CREATININE 1.10 11/27/2023    CALCIUM 9.6 11/27/2023    ALBUMIN 4.2 11/27/2023    BILITOT 0.6 11/27/2023    ALKPHOS 62 11/27/2023    AST 19 11/27/2023    ALT 20 11/27/2023    EGFRNORACEVR >60 11/27/2023     Lab Results   Component Value Date    WBC 5.58 11/27/2023    RBC 5.44 11/27/2023    HGB 15.9 11/27/2023    HCT 47.3 11/27/2023    MCV 86.9 11/27/2023    RDW 13.2 11/27/2023     11/27/2023      Lab Results   Component Value Date    CHOL 239 (H) 11/27/2023    TRIG 218 (H) 11/27/2023    HDL 59 11/27/2023    .00 11/27/2023    TOTALCHOLEST 4 11/27/2023     Lab Results   Component Value Date    TSH 0.822 11/27/2023     Lab Results   Component Value Date    HGBA1C 8.3 (H) 11/27/2023        Lab Results   Component Value Date    PSA 0.50 11/27/2023         Assessment:       ICD-10-CM ICD-9-CM   1. Wellness examination  Z00.00 V70.0   2. Screening for prostate cancer  Z12.5 V76.44   3. Primary hypertension  I10 401.9   4. Type 2 diabetes mellitus with hyperglycemia, without long-term current use of insulin  E11.65 250.00      790.29   5. Dyslipidemia  E78.5 272.4   6. Class 1 obesity due to excess calories with serious comorbidity and body mass index (BMI) of 30.0 to 30.9 in adult  E66.09 278.00    Z68.30 V85.30   7. Statin intolerance  Z78.9 995.27       Current Outpatient Medications   Medication Instructions    amLODIPine (NORVASC) 10 mg, Oral, Daily    blood-glucose meter kit   ACCU-CHEK GUIDE TEST STRIPS 100S, See Instructions, USE TO TEST BLOOD GLUCOSE ONCE DAILY AS DIRECTED, # 100 unknown unit, 2 Refill(s), Pharmacy: [facility] #[id number], 169, cm, Height/Length Dosing, 12/03/20 14:03:00 CST, 88.5, kg, Weight Dosing...    carvediloL (COREG) 50 mg, Oral, 2 times daily    chlorthalidone (HYGROTEN) 25 mg, Oral, Daily    FARXIGA 10 mg, Oral, Daily    glipiZIDE (GLUCOTROL) 10 mg, Oral, 2 times daily    hydrALAZINE (APRESOLINE) 50 mg, Oral, 2 times daily    metFORMIN (GLUCOPHAGE) 500 mg, Oral, 2 times daily    MOUNJARO 2.5 mg, Subcutaneous, Every 7 days    olmesartan (BENICAR) 40 mg, Oral, Daily    pioglitazone (ACTOS) 45 mg, Oral, Daily    rosuvastatin (CRESTOR) 10 mg, Oral, Daily     Plan:     Problem List Items Addressed This Visit          Cardiac/Vascular    Primary hypertension (Chronic)    Overview     Prescribed olmesartan 40 mg daily, Coreg 50 mg b.i.d., amlodipine 10 mg daily, chlorthalidone 25 mg daily.         Current Assessment & Plan     Blood pressures appear to be adequately controlled with current treatment plan, including prescription blood pressure medication. Continue medical management and continue home blood pressure checks.  Blood pressure should be checked as discussed during medical visits, and average blood pressure should be no greater than 130/80.         Relevant Medications    hydrALAZINE (APRESOLINE) 50 MG tablet    Dyslipidemia (Chronic)    Overview     Prescribed Crestor 10 mg daily.  Starting on 11/29/2023.  We will see if he has intolerance.         Current Assessment & Plan              "  Component Ref Range & Units 1 d ago 1 yr ago 2 yr ago 3 yr ago 4 yr ago 5 yr ago   Cholesterol Total <=200 mg/dL 239 High  206 High  171 R 202 High  R 168 R 177 R   HDL Cholesterol 35 - 60 mg/dL 59 58 56 56 70 High  68 High    Triglyceride 34 - 140 mg/dL 218 High  206 High  147 High  90 130 R 99 R   Cholesterol/HDL Ratio 0 - 5 4 4 3 4 2.4 R 2.6 R   Very Low Density Lipoprotein  44 41 29 18 26 R 20 R   LDL Cholesterol 50.00 - 140.00 mg/dL 136.00 107.00 86.00 128.00 72 R 89 R      Consume a diet low in saturated fats, (fats that are solid at room temperature such as animal fat) and trans fats, using healthy fats if necessary, olive oil and canola oil are 2 examples.  Increasing daily fiber intake can be very important in controlling cholesterol elevation as well.  Weight loss, especially weight loss associated with exercise can significantly lower lipid levels.  During future visits, depending on response to dietary changes, medication or change in dosage if already on lipid lowering medications may be considered if lipid levels continue to be significantly elevated.         Relevant Medications    rosuvastatin (CRESTOR) 10 MG tablet       Endocrine    Class 1 obesity due to excess calories with serious comorbidity and body mass index (BMI) of 30.0 to 30.9 in adult (Chronic)    Overview     Weight loss, healthy dietary choices, increased activity/exercise are recommended.  To lose weight, it is generally recommended to restrict calories to approximately 1500 calories per day to lose 1 lb per week, and approximately 1200 calories per day to lose up to 2 lb per week.  Generally, this is a healthy amount of weight to lose, and an appropriate amount of time to lose this amount of weight.  Adding exercise will assist in losing weight, particularly aerobic exercise such as walking.  However, resistance training, or lifting weights" over time may assistant weight loss by increasing basal metabolic rate, or the rate at " which your body burns calories during periods of inactivity.    Keep track of BMI (body mass index), below 25 is considered normal, over 25 but below 30 is considered overweight, anything over 30 is considered moderately obese, over 35 severely obese, and over 40 is characterized as morbidly obese.         Type 2 diabetes mellitus with hyperglycemia, without long-term current use of insulin (Chronic)    Overview     Prescribed Mounjaro 2.5 mg weekly, Farxiga 10 mg daily, glipizide XL 10 mg b.i.d., metformin 500 mg b.i.d., Actos 45 mg daily.    Januvia discontinued.         Current Assessment & Plan                Component Ref Range & Units 1 d ago  (11/27/23) 4 mo ago  (7/24/23) 7 mo ago  (4/19/23) 1 yr ago  (9/28/22) 1 yr ago  (3/25/22) 2 yr ago  (9/9/21) 2 yr ago  (12/2/20)   Hemoglobin A1c <=7.0 % 8.3 High  8.5 High  8.1 High  8.5 High  8.5 R 7.8 High  R 7.4 High  R   Estimated Average Glucose mg/dL 191.5 197.3 185.8 197.3 197.2 R 177.2 165.7      A1c still shows lack of control at 8.3%.  Start Mounjaro, discontinue Januvia.         Relevant Medications    tirzepatide (MOUNJARO) 2.5 mg/0.5 mL PnIj    Other Relevant Orders    Hemoglobin A1C    Microalbumin/Creatinine Ratio, Urine       Other    Statin intolerance (Chronic)    Overview     Trying Crestor 10 mg daily.          Other Visit Diagnoses       Wellness examination    -  Primary    Overall health status was reviewed.  Good health habits reinforced.  Annual wellness exams recommended.    Screening for prostate cancer                        Follow up in about 3 months (around 3/5/2024).

## 2023-11-29 NOTE — ASSESSMENT & PLAN NOTE
Component Ref Range & Units 1 d ago  (11/27/23) 4 mo ago  (7/24/23) 7 mo ago  (4/19/23) 1 yr ago  (9/28/22) 1 yr ago  (3/25/22) 2 yr ago  (9/9/21) 2 yr ago  (12/2/20)   Hemoglobin A1c <=7.0 % 8.3 High  8.5 High  8.1 High  8.5 High  8.5 R 7.8 High  R 7.4 High  R   Estimated Average Glucose mg/dL 191.5 197.3 185.8 197.3 197.2 R 177.2 165.7        A1c still shows lack of control at 8.3%.  Start Mounjaro, discontinue Januvia.

## 2023-11-29 NOTE — ASSESSMENT & PLAN NOTE
Component Ref Range & Units 1 d ago 1 yr ago 2 yr ago 3 yr ago 4 yr ago 5 yr ago   Cholesterol Total <=200 mg/dL 239 High  206 High  171 R 202 High  R 168 R 177 R   HDL Cholesterol 35 - 60 mg/dL 59 58 56 56 70 High  68 High    Triglyceride 34 - 140 mg/dL 218 High  206 High  147 High  90 130 R 99 R   Cholesterol/HDL Ratio 0 - 5 4 4 3 4 2.4 R 2.6 R   Very Low Density Lipoprotein  44 41 29 18 26 R 20 R   LDL Cholesterol 50.00 - 140.00 mg/dL 136.00 107.00 86.00 128.00 72 R 89 R        Consume a diet low in saturated fats, (fats that are solid at room temperature such as animal fat) and trans fats, using healthy fats if necessary, olive oil and canola oil are 2 examples.  Increasing daily fiber intake can be very important in controlling cholesterol elevation as well.  Weight loss, especially weight loss associated with exercise can significantly lower lipid levels.  During future visits, depending on response to dietary changes, medication or change in dosage if already on lipid lowering medications may be considered if lipid levels continue to be significantly elevated.

## 2024-03-07 ENCOUNTER — CLINICAL SUPPORT (OUTPATIENT)
Dept: PRIMARY CARE CLINIC | Facility: CLINIC | Age: 49
End: 2024-03-07
Payer: COMMERCIAL

## 2024-03-07 DIAGNOSIS — E11.65 TYPE 2 DIABETES MELLITUS WITH HYPERGLYCEMIA, WITHOUT LONG-TERM CURRENT USE OF INSULIN: Chronic | ICD-10-CM

## 2024-03-07 DIAGNOSIS — E11.65 TYPE 2 DIABETES MELLITUS WITH HYPERGLYCEMIA, WITHOUT LONG-TERM CURRENT USE OF INSULIN: Primary | Chronic | ICD-10-CM

## 2024-03-07 LAB
CREAT UR-MCNC: 50.1 MG/DL (ref 63–166)
EST. AVERAGE GLUCOSE BLD GHB EST-MCNC: 200.1 MG/DL
HBA1C MFR BLD: 8.6 %
MICROALBUMIN UR-MCNC: <5 UG/ML
MICROALBUMIN/CREAT RATIO PNL UR: ABNORMAL

## 2024-03-07 PROCEDURE — 83036 HEMOGLOBIN GLYCOSYLATED A1C: CPT | Performed by: GENERAL PRACTICE

## 2024-03-07 PROCEDURE — 82043 UR ALBUMIN QUANTITATIVE: CPT | Performed by: GENERAL PRACTICE

## 2024-03-07 PROCEDURE — 36415 COLL VENOUS BLD VENIPUNCTURE: CPT

## 2024-03-07 PROCEDURE — 36415 COLL VENOUS BLD VENIPUNCTURE: CPT | Mod: ,,, | Performed by: GENERAL PRACTICE

## 2024-03-12 ENCOUNTER — DOCUMENTATION ONLY (OUTPATIENT)
Dept: PRIMARY CARE CLINIC | Facility: CLINIC | Age: 49
End: 2024-03-12

## 2024-03-12 ENCOUNTER — OFFICE VISIT (OUTPATIENT)
Dept: PRIMARY CARE CLINIC | Facility: CLINIC | Age: 49
End: 2024-03-12
Payer: COMMERCIAL

## 2024-03-12 VITALS
BODY MASS INDEX: 29.66 KG/M2 | WEIGHT: 189 LBS | HEIGHT: 67 IN | RESPIRATION RATE: 18 BRPM | DIASTOLIC BLOOD PRESSURE: 80 MMHG | HEART RATE: 70 BPM | SYSTOLIC BLOOD PRESSURE: 135 MMHG | OXYGEN SATURATION: 100 %

## 2024-03-12 DIAGNOSIS — F41.1 GENERALIZED ANXIETY DISORDER: Chronic | ICD-10-CM

## 2024-03-12 DIAGNOSIS — E11.65 TYPE 2 DIABETES MELLITUS WITH HYPERGLYCEMIA, WITHOUT LONG-TERM CURRENT USE OF INSULIN: Primary | Chronic | ICD-10-CM

## 2024-03-12 DIAGNOSIS — E66.3 OVERWEIGHT (BMI 25.0-29.9): ICD-10-CM

## 2024-03-12 DIAGNOSIS — E78.5 DYSLIPIDEMIA: Chronic | ICD-10-CM

## 2024-03-12 DIAGNOSIS — I10 PRIMARY HYPERTENSION: Chronic | ICD-10-CM

## 2024-03-12 PROCEDURE — 99214 OFFICE O/P EST MOD 30 MIN: CPT | Mod: ,,, | Performed by: GENERAL PRACTICE

## 2024-03-12 PROCEDURE — 4010F ACE/ARB THERAPY RXD/TAKEN: CPT | Mod: CPTII,,, | Performed by: GENERAL PRACTICE

## 2024-03-12 PROCEDURE — 3052F HG A1C>EQUAL 8.0%<EQUAL 9.0%: CPT | Mod: CPTII,,, | Performed by: GENERAL PRACTICE

## 2024-03-12 PROCEDURE — 3008F BODY MASS INDEX DOCD: CPT | Mod: CPTII,,, | Performed by: GENERAL PRACTICE

## 2024-03-12 PROCEDURE — 1159F MED LIST DOCD IN RCRD: CPT | Mod: CPTII,,, | Performed by: GENERAL PRACTICE

## 2024-03-12 PROCEDURE — 3066F NEPHROPATHY DOC TX: CPT | Mod: CPTII,,, | Performed by: GENERAL PRACTICE

## 2024-03-12 PROCEDURE — 1160F RVW MEDS BY RX/DR IN RCRD: CPT | Mod: CPTII,,, | Performed by: GENERAL PRACTICE

## 2024-03-12 PROCEDURE — 3079F DIAST BP 80-89 MM HG: CPT | Mod: CPTII,,, | Performed by: GENERAL PRACTICE

## 2024-03-12 PROCEDURE — 3061F NEG MICROALBUMINURIA REV: CPT | Mod: CPTII,,, | Performed by: GENERAL PRACTICE

## 2024-03-12 PROCEDURE — 3075F SYST BP GE 130 - 139MM HG: CPT | Mod: CPTII,,, | Performed by: GENERAL PRACTICE

## 2024-03-12 RX ORDER — TIRZEPATIDE 5 MG/.5ML
5 INJECTION, SOLUTION SUBCUTANEOUS
Qty: 4 PEN | Refills: 11 | Status: SHIPPED | OUTPATIENT
Start: 2024-03-12 | End: 2025-03-12

## 2024-03-12 RX ORDER — DAPAGLIFLOZIN 10 MG/1
10 TABLET, FILM COATED ORAL DAILY
Qty: 90 TABLET | Refills: 3 | Status: SHIPPED | OUTPATIENT
Start: 2024-03-12 | End: 2025-03-12

## 2024-03-12 RX ORDER — BLOOD-GLUCOSE SENSOR
EACH MISCELLANEOUS
Qty: 3 EACH | Refills: 11 | Status: SHIPPED | OUTPATIENT
Start: 2024-03-12

## 2024-03-12 RX ORDER — BLOOD-GLUCOSE,RECEIVER,CONT
EACH MISCELLANEOUS
Qty: 1 EACH | Refills: 1 | Status: SHIPPED | OUTPATIENT
Start: 2024-03-12

## 2024-03-12 RX ORDER — BLOOD-GLUCOSE TRANSMITTER
EACH MISCELLANEOUS
Qty: 1 EACH | Refills: 11 | Status: SHIPPED | OUTPATIENT
Start: 2024-03-12

## 2024-03-12 NOTE — ASSESSMENT & PLAN NOTE
Component Ref Range & Units 4 d ago  (3/7/24) 3 mo ago  (11/27/23) 7 mo ago  (7/24/23) 10 mo ago  (4/19/23) 1 yr ago  (9/28/22) 1 yr ago  (3/25/22) 2 yr ago  (9/9/21)   Hemoglobin A1c <=7.0 % 8.6 High  8.3 High  8.5 High  8.1 High  8.5 High  8.5 R 7.8 High  R   Estimated Average Glucose mg/dL 200.1 191.5 197.3 185.8 197.3 197.2 R 177.2

## 2024-03-12 NOTE — LETTER
AUTHORIZATION FOR RELEASE OF   CONFIDENTIAL INFORMATION    Dear Tim Torres OD,    We are seeing Humphrey Gallegos, date of birth 1975, in the clinic at [facility]. Michael Sherman MD is the patient's PCP. Humphrey Gallegos has an outstanding lab/procedure at the time we reviewed his chart. In order to help keep his health information updated, he has authorized us to request the following medical record(s):        (  )  MAMMOGRAM                                      (  )  COLONOSCOPY      (  )  PAP SMEAR                                          (  )  OUTSIDE LAB RESULTS     (  )  DEXA SCAN                                          ( X )  EYE EXAM            (  )  FOOT EXAM                                          (  )  ENTIRE RECORD     (  )  OUTSIDE IMMUNIZATIONS                 (  )  _______________         Please fax records to [facility], Simon, Pernell, MD, [phone]     If you have any questions, please contact Tri Grant LPN at [phone].           Patient Name: Humphrey Gallegos  : 1975  Patient Phone #: [phone]

## 2024-03-12 NOTE — PROGRESS NOTES
"Subjective:       Patient ID: Humphrey Gallegos is a 48 y.o. male.    Chief Complaint: Diabetes and Follow-up    Patient presents to the clinic today for chronic condition follow-up.  Doing well, no specific complaints, tolerating all medications, reporting no significant side effects or problems.    Last wellness exam was 11/29/2023.    Chronic conditions being treated include but are not limited to primary hypertension, dyslipidemia, diabetes mellitus, generalized anxiety disorder.      Review of Systems   Constitutional: Negative.  Negative for fatigue and fever.   HENT: Negative.  Negative for sore throat and trouble swallowing.    Eyes: Negative.  Negative for redness and visual disturbance.   Respiratory: Negative.  Negative for chest tightness and shortness of breath.    Cardiovascular: Negative.  Negative for chest pain.   Gastrointestinal: Negative.  Negative for abdominal pain, blood in stool and nausea.   Endocrine: Negative.  Negative for cold intolerance, heat intolerance and polyuria.   Genitourinary: Negative.    Musculoskeletal: Negative.  Negative for arthralgias, gait problem and joint swelling.   Integumentary:  Negative for rash. Negative.   Neurological: Negative.  Negative for dizziness, weakness and headaches.   Psychiatric/Behavioral: Negative.  Negative for agitation and dysphoric mood.            Patient Care Team:  Michael Sherman MD as PCP - General (Family Medicine)  Thaddeus Williamson DO as Consulting Physician (Cardiology)  Tim Torres OD (Optometry)  Ruth Ng LPN as Care Coordinator  Objective:   Visit Vitals  /80   Pulse 70   Resp 18   Ht 5' 7" (1.702 m)   Wt 85.7 kg (189 lb)   SpO2 100%   BMI 29.60 kg/m²        Physical Exam  Constitutional:       Appearance: Normal appearance.   HENT:      Head: Normocephalic.      Mouth/Throat:      Mouth: Mucous membranes are moist.      Pharynx: Oropharynx is clear.   Eyes:      Conjunctiva/sclera: Conjunctivae normal.      Pupils: " Pupils are equal, round, and reactive to light.   Cardiovascular:      Rate and Rhythm: Normal rate and regular rhythm.      Heart sounds: Normal heart sounds.   Pulmonary:      Effort: Pulmonary effort is normal.      Breath sounds: Normal breath sounds.   Abdominal:      General: Abdomen is flat.      Palpations: Abdomen is soft.   Musculoskeletal:         General: Normal range of motion.      Cervical back: Neck supple.   Skin:     General: Skin is warm and dry.   Neurological:      General: No focal deficit present.      Mental Status: He is alert and oriented to person, place, and time.   Psychiatric:         Mood and Affect: Mood normal.         Behavior: Behavior normal.         Thought Content: Thought content normal.         Judgment: Judgment normal.           Lab Results   Component Value Date     11/27/2023    K 4.2 11/27/2023    CO2 27 11/27/2023    BUN 22.1 (H) 11/27/2023    CREATININE 1.10 11/27/2023    CALCIUM 9.6 11/27/2023    ALBUMIN 4.2 11/27/2023    BILITOT 0.6 11/27/2023    ALKPHOS 62 11/27/2023    AST 19 11/27/2023    ALT 20 11/27/2023    EGFRNORACEVR >60 11/27/2023     Lab Results   Component Value Date    WBC 5.58 11/27/2023    RBC 5.44 11/27/2023    HGB 15.9 11/27/2023    HCT 47.3 11/27/2023    MCV 86.9 11/27/2023    RDW 13.2 11/27/2023     11/27/2023      Lab Results   Component Value Date    CHOL 239 (H) 11/27/2023    TRIG 218 (H) 11/27/2023    HDL 59 11/27/2023    .00 11/27/2023    TOTALCHOLEST 4 11/27/2023     Lab Results   Component Value Date    TSH 0.822 11/27/2023     Lab Results   Component Value Date    HGBA1C 8.6 (H) 03/07/2024        Lab Results   Component Value Date    PSA 0.50 11/27/2023         Assessment:       ICD-10-CM ICD-9-CM   1. Type 2 diabetes mellitus with hyperglycemia, without long-term current use of insulin  E11.65 250.00     790.29   2. Primary hypertension  I10 401.9   3. Dyslipidemia  E78.5 272.4   4. Generalized anxiety disorder  F41.1  300.02   5. Overweight (BMI 25.0-29.9)  E66.3 278.02       Current Outpatient Medications   Medication Instructions    amLODIPine (NORVASC) 10 mg, Oral, Daily    blood-glucose meter kit   ACCU-CHEK GUIDE TEST STRIPS 100S, See Instructions, USE TO TEST BLOOD GLUCOSE ONCE DAILY AS DIRECTED, # 100 unknown unit, 2 Refill(s), Pharmacy: [id number], 169, cm, Height/Length Dosing, 12/03/20 14:03:00 CST, 88.5, kg, Weight Dosing...    carvediloL (COREG) 50 mg, Oral, 2 times daily    chlorthalidone (HYGROTEN) 25 mg, Oral, Daily    FARXIGA 10 mg, Oral, Daily    glipiZIDE (GLUCOTROL) 10 mg, Oral, 2 times daily    hydrALAZINE (APRESOLINE) 50 mg, Oral, 2 times daily    metFORMIN (GLUCOPHAGE) 500 mg, Oral, 2 times daily    MOUNJARO 5 mg, Subcutaneous, Every 7 days    olmesartan (BENICAR) 40 mg, Oral, Daily    pioglitazone (ACTOS) 45 mg, Oral, Daily    rosuvastatin (CRESTOR) 10 mg, Oral, Daily     Plan:     Problem List Items Addressed This Visit          Psychiatric    Generalized anxiety disorder (Chronic)    Overview     Prescribed Zoloft 50 mg daily.     Patient reports stability of moods, and effectiveness of medications, including no agitation, significant somnolence, or significant bouts of anxiety or depression. Patient is not having any sleep disturbance. Current treatment plan will continue, with plans to discuss any significant changes in patient's status if necessary if anything changes in the future.             Cardiac/Vascular    Primary hypertension (Chronic)    Overview     Prescribed olmesartan 40 mg daily, Coreg 50 mg b.i.d., amlodipine 10 mg daily, chlorthalidone 25 mg daily.    Blood pressures appear to be adequately controlled with current treatment plan, including prescription blood pressure medication. Continue medical management and continue home blood pressure checks.  Blood pressure should be checked as discussed during medical visits, and average blood pressure should be no greater than  130/80.         Dyslipidemia (Chronic)    Overview     Prescribed Crestor 10 mg daily.      Very mild myalgia, is able to tolerate it, we will check his lipid panel at his next wellness            Endocrine    Type 2 diabetes mellitus with hyperglycemia, without long-term current use of insulin - Primary (Chronic)    Overview     Prescribed Mounjaro 5 mg weekly, Farxiga 10 mg daily, glipizide XL 10 mg b.i.d., metformin 500 mg b.i.d., Actos 45 mg daily.    At 8.6%, diabetes continues to be uncontrolled.  He was not taking his Farxiga, we will restart that medication and look into getting him a freestyle Parul or something for continuous monitoring.  Mounjaro dose was increased to 5 mg weekly, recheck in three months.             Current Assessment & Plan                Component Ref Range & Units 4 d ago  (3/7/24) 3 mo ago  (11/27/23) 7 mo ago  (7/24/23) 10 mo ago  (4/19/23) 1 yr ago  (9/28/22) 1 yr ago  (3/25/22) 2 yr ago  (9/9/21)   Hemoglobin A1c <=7.0 % 8.6 High  8.3 High  8.5 High  8.1 High  8.5 High  8.5 R 7.8 High  R   Estimated Average Glucose mg/dL 200.1 191.5 197.3 185.8 197.3 197.2 R 177.2             Relevant Medications    tirzepatide (MOUNJARO) 5 mg/0.5 mL PnIj    FARXIGA 10 mg tablet    Other Relevant Orders    Hemoglobin A1C    Overweight (BMI 25.0-29.9) (Chronic)    Overview     Weight loss, healthy dietary choices, increased activity and exercise are all recommended.                    Follow up in about 3 months (around 6/13/2024) for DM F/up.

## 2024-05-20 ENCOUNTER — TELEPHONE (OUTPATIENT)
Dept: PRIMARY CARE CLINIC | Facility: CLINIC | Age: 49
End: 2024-05-20
Payer: COMMERCIAL

## 2024-05-20 DIAGNOSIS — E11.65 TYPE 2 DIABETES MELLITUS WITH HYPERGLYCEMIA, WITHOUT LONG-TERM CURRENT USE OF INSULIN: Chronic | ICD-10-CM

## 2024-05-20 RX ORDER — CHLORTHALIDONE 25 MG/1
25 TABLET ORAL DAILY
Qty: 90 TABLET | Refills: 3 | Status: SHIPPED | OUTPATIENT
Start: 2024-05-20

## 2024-05-20 RX ORDER — PIOGLITAZONEHYDROCHLORIDE 45 MG/1
45 TABLET ORAL DAILY
Qty: 90 TABLET | Refills: 3 | Status: SHIPPED | OUTPATIENT
Start: 2024-05-20 | End: 2025-05-20

## 2024-05-20 NOTE — TELEPHONE ENCOUNTER
----- Message from Leilani Seth sent at 5/20/2024  2:53 PM CDT -----  Regarding: med refill  .Who Called: Humphrey Gallegos    Refill or New Rx:Refill  RX Name and Strength:chlorthalidone (HYGROTEN) 25 MG Tab  How is the patient currently taking it? (ex. 1XDay):1xday  Is this a 30 day or 90 day RX:90    Refill or New Rx:Refill  RX Name and Strength:pioglitazone (ACTOS) 45 MG tablet  How is the patient currently taking it? (ex. 1XDay):1xday  Is this a 30 day or 90 day RX:90    Local or Mail Order:local  List of preferred pharmacies on file (remove unneeded): [id number] - [place] [id number] [facility]   Phone: [phone]  Fax: [phone]        Ordering Provider:Lane      Preferred Method of Contact: Phone Call  Patient's Preferred Phone Number on File: [phone]   Best Call Back Number, if different:  Additional Information: pt needs authorization from  to refill prescription

## 2024-06-19 ENCOUNTER — CLINICAL SUPPORT (OUTPATIENT)
Dept: PRIMARY CARE CLINIC | Facility: CLINIC | Age: 49
End: 2024-06-19
Payer: COMMERCIAL

## 2024-06-19 DIAGNOSIS — E11.65 TYPE 2 DIABETES MELLITUS WITH HYPERGLYCEMIA, WITHOUT LONG-TERM CURRENT USE OF INSULIN: Primary | Chronic | ICD-10-CM

## 2024-06-19 DIAGNOSIS — E11.65 TYPE 2 DIABETES MELLITUS WITH HYPERGLYCEMIA, WITHOUT LONG-TERM CURRENT USE OF INSULIN: Chronic | ICD-10-CM

## 2024-06-19 LAB
EST. AVERAGE GLUCOSE BLD GHB EST-MCNC: 162.8 MG/DL
HBA1C MFR BLD: 7.3 %

## 2024-06-19 PROCEDURE — 83036 HEMOGLOBIN GLYCOSYLATED A1C: CPT | Performed by: GENERAL PRACTICE

## 2024-06-19 PROCEDURE — 36415 COLL VENOUS BLD VENIPUNCTURE: CPT

## 2024-06-19 PROCEDURE — 36415 COLL VENOUS BLD VENIPUNCTURE: CPT | Mod: ,,, | Performed by: GENERAL PRACTICE

## 2024-06-19 NOTE — PROGRESS NOTES
Wellness labs drawn from the right arm with butterfly needle. Pt expressed no pain and tolerated well.

## 2024-06-20 NOTE — PROGRESS NOTES
"Subjective:       Patient ID: Humphrey Gallegos is a 49 y.o. male.    Chief Complaint: Diabetes and Follow-up    Established patient, returns today for his chronic conditions, diabetes follow-up visit.  Current A1c improved at 7.3%.      Review of Systems   Constitutional: Negative.  Negative for fatigue and fever.   HENT: Negative.  Negative for sore throat and trouble swallowing.    Eyes: Negative.  Negative for redness and visual disturbance.   Respiratory: Negative.  Negative for chest tightness and shortness of breath.    Cardiovascular: Negative.  Negative for chest pain.   Gastrointestinal: Negative.  Negative for abdominal pain, blood in stool and nausea.   Endocrine: Negative.  Negative for cold intolerance, heat intolerance and polyuria.   Genitourinary: Negative.    Musculoskeletal: Negative.  Negative for arthralgias, gait problem and joint swelling.   Integumentary:  Negative for rash. Negative.   Neurological: Negative.  Negative for dizziness, weakness and headaches.   Psychiatric/Behavioral: Negative.  Negative for agitation and dysphoric mood.            Patient Care Team:  Michael Sherman MD as PCP - General (Family Medicine)  Thaddeus Williamson DO as Consulting Physician (Cardiology)  Tim Torres OD (Optometry)  Ruth Ng LPN as Care Coordinator  Objective:   Visit Vitals  /87   Pulse 78   Resp 18   Ht 5' 7" (1.702 m)   Wt 85.7 kg (189 lb)   SpO2 99%   BMI 29.60 kg/m²        Physical Exam  Constitutional:       Appearance: Normal appearance.   Eyes:      Conjunctiva/sclera: Conjunctivae normal.   Cardiovascular:      Rate and Rhythm: Normal rate and regular rhythm.   Pulmonary:      Effort: Pulmonary effort is normal.      Breath sounds: Normal breath sounds.   Skin:     General: Skin is warm and dry.   Neurological:      Mental Status: He is alert.   Psychiatric:         Mood and Affect: Mood normal.         Behavior: Behavior normal.           Lab Results   Component Value Date    NA " 136 11/27/2023    K 4.2 11/27/2023     11/27/2023    CO2 27 11/27/2023    BUN 22.1 (H) 11/27/2023    CREATININE 1.10 11/27/2023    CALCIUM 9.6 11/27/2023    ALBUMIN 4.2 11/27/2023    BILITOT 0.6 11/27/2023    ALKPHOS 62 11/27/2023    AST 19 11/27/2023    ALT 20 11/27/2023    EGFRNORACEVR >60 11/27/2023     Lab Results   Component Value Date    WBC 5.58 11/27/2023    RBC 5.44 11/27/2023    HGB 15.9 11/27/2023    HCT 47.3 11/27/2023    MCV 86.9 11/27/2023    RDW 13.2 11/27/2023     11/27/2023      Lab Results   Component Value Date    CHOL 239 (H) 11/27/2023    TRIG 218 (H) 11/27/2023    HDL 59 11/27/2023    .00 11/27/2023    TOTALCHOLEST 4 11/27/2023     Lab Results   Component Value Date    TSH 0.822 11/27/2023     Lab Results   Component Value Date    HGBA1C 7.3 (H) 06/19/2024        Lab Results   Component Value Date    PSA 0.50 11/27/2023         Assessment:       ICD-10-CM ICD-9-CM   1. Type 2 diabetes mellitus with hyperglycemia, without long-term current use of insulin  E11.65 250.00     790.29   2. Primary hypertension  I10 401.9   3. Dyslipidemia  E78.5 272.4   4. Generalized anxiety disorder  F41.1 300.02   5. Screening for colon cancer  Z12.11 V76.51   6. Screening for prostate cancer  Z12.5 V76.44       Current Outpatient Medications   Medication Instructions    amLODIPine (NORVASC) 10 mg, Oral, Daily    blood-glucose meter kit   ACCU-CHEK GUIDE TEST STRIPS 100S, See Instructions, USE TO TEST BLOOD GLUCOSE ONCE DAILY AS DIRECTED, # 100 unknown unit, 2 Refill(s), Pharmacy: [facility] #[id number], 169, cm, Height/Length Dosing, 12/03/20 14:03:00 CST, 88.5, kg, Weight Dosing...    blood-glucose meter,continuous (DEXCOM G6 ) Misc Used to monitor blood glucose    blood-glucose sensor (DEXCOM G6 SENSOR) Fatimah Used with DEXCOM G6 to monitor blood glucose    blood-glucose transmitter (DEXCOM G6 TRANSMITTER) Fatimah Use with DEXCOM G6 to monitor blood glucose    carvediloL (COREG) 50  mg, Oral, 2 times daily    chlorthalidone (HYGROTEN) 25 mg, Oral, Daily    FARXIGA 10 mg, Oral, Daily    glipiZIDE (GLUCOTROL) 10 mg, Oral, 2 times daily    hydrALAZINE (APRESOLINE) 50 mg, Oral, 2 times daily    metFORMIN (GLUCOPHAGE) 500 mg, Oral, 2 times daily    MOUNJARO 5 mg, Subcutaneous, Every 7 days    olmesartan (BENICAR) 40 mg, Oral, Daily    pioglitazone (ACTOS) 45 mg, Oral, Daily    rosuvastatin (CRESTOR) 10 mg, Oral, Daily     Plan:     Problem List Items Addressed This Visit          Psychiatric    Generalized anxiety disorder (Chronic)    Overview     Prescribed Zoloft 50 mg daily.     Patient reports stability of moods, and effectiveness of medications, including no agitation, significant somnolence, or significant bouts of anxiety or depression. Patient is not having any sleep disturbance. Current treatment plan will continue, with plans to discuss any significant changes in patient's status if necessary if anything changes in the future.             Cardiac/Vascular    Primary hypertension (Chronic)    Overview     Prescribed olmesartan 40 mg daily, Coreg 50 mg b.i.d., amlodipine 10 mg daily, chlorthalidone 25 mg daily.    Blood pressures appear to be adequately controlled with current treatment plan, including prescription blood pressure medication. Continue medical management and continue home blood pressure checks.  Blood pressure should be checked as discussed during medical visits, and average blood pressure should be no greater than 130/80.         Relevant Medications    amLODIPine (NORVASC) 10 MG tablet    carvediloL (COREG) 25 MG tablet    olmesartan (BENICAR) 40 MG tablet    Other Relevant Orders    Comprehensive Metabolic Panel    CBC Auto Differential    TSH    Dyslipidemia (Chronic)    Overview     Prescribed Crestor 10 mg daily.      Very mild myalgia, is able to tolerate it, we will check his lipid panel at his next wellness         Relevant Orders    Lipid Panel       Endocrine     Type 2 diabetes mellitus with hyperglycemia, without long-term current use of insulin - Primary (Chronic)    Overview     Prescribed Mounjaro 5 mg weekly, Farxiga 10 mg daily, glipizide XL 10 mg b.i.d., metformin 500 mg b.i.d., Actos 45 mg daily.    At 7.3%, control is better compared to 8.6% three months ago, we will continue to modify his treatment plan in the attempt to get his A1c around 6.5%, possibly a bit lower.             Current Assessment & Plan                Component Ref Range & Units 1 d ago  (6/19/24) 3 mo ago  (3/7/24) 6 mo ago  (11/27/23) 11 mo ago  (7/24/23) 1 yr ago  (4/19/23) 1 yr ago  (9/28/22) 2 yr ago  (3/25/22)   Hemoglobin A1c <=7.0 % 7.3 High  8.6 High  8.3 High  8.5 High  8.1 High  8.5 High  8.5 R   Estimated Average Glucose mg/dL 162.8 200.1 191.5 197.3 185.8 197.3 197.2 R               Relevant Medications    glipiZIDE (GLUCOTROL) 10 MG TR24    metFORMIN (GLUCOPHAGE) 500 MG tablet    Other Relevant Orders    Hemoglobin A1C    Hemoglobin A1C     Other Visit Diagnoses       Screening for colon cancer        Referred for screening colonoscopy    Screening for prostate cancer        This diagnosis does not apply to this visit, appropriate prostate cancer screening will be ordered for next visit/wellness exam.    Relevant Orders    PSA, Screening                  Follow-up in 3 months for diabetic recheck with pre visit A1c.    Follow up in about 5 months (around 12/3/2024) for Wellness.

## 2024-06-20 NOTE — ASSESSMENT & PLAN NOTE
Component Ref Range & Units 1 d ago  (6/19/24) 3 mo ago  (3/7/24) 6 mo ago  (11/27/23) 11 mo ago  (7/24/23) 1 yr ago  (4/19/23) 1 yr ago  (9/28/22) 2 yr ago  (3/25/22)   Hemoglobin A1c <=7.0 % 7.3 High  8.6 High  8.3 High  8.5 High  8.1 High  8.5 High  8.5 R   Estimated Average Glucose mg/dL 162.8 200.1 191.5 197.3 185.8 197.3 197.2 R

## 2024-06-21 ENCOUNTER — OFFICE VISIT (OUTPATIENT)
Dept: PRIMARY CARE CLINIC | Facility: CLINIC | Age: 49
End: 2024-06-21
Payer: COMMERCIAL

## 2024-06-21 VITALS
HEART RATE: 78 BPM | BODY MASS INDEX: 29.66 KG/M2 | RESPIRATION RATE: 18 BRPM | DIASTOLIC BLOOD PRESSURE: 87 MMHG | WEIGHT: 189 LBS | SYSTOLIC BLOOD PRESSURE: 135 MMHG | HEIGHT: 67 IN | OXYGEN SATURATION: 99 %

## 2024-06-21 DIAGNOSIS — Z12.5 SCREENING FOR PROSTATE CANCER: ICD-10-CM

## 2024-06-21 DIAGNOSIS — E11.65 TYPE 2 DIABETES MELLITUS WITH HYPERGLYCEMIA, WITHOUT LONG-TERM CURRENT USE OF INSULIN: Primary | Chronic | ICD-10-CM

## 2024-06-21 DIAGNOSIS — F41.1 GENERALIZED ANXIETY DISORDER: Chronic | ICD-10-CM

## 2024-06-21 DIAGNOSIS — Z12.11 SCREENING FOR COLON CANCER: ICD-10-CM

## 2024-06-21 DIAGNOSIS — I10 PRIMARY HYPERTENSION: Chronic | ICD-10-CM

## 2024-06-21 DIAGNOSIS — E78.5 DYSLIPIDEMIA: Chronic | ICD-10-CM

## 2024-06-21 RX ORDER — OLMESARTAN MEDOXOMIL 40 MG/1
40 TABLET ORAL DAILY
Qty: 90 TABLET | Refills: 3 | Status: SHIPPED | OUTPATIENT
Start: 2024-06-21 | End: 2025-06-21

## 2024-06-21 RX ORDER — GLIPIZIDE 10 MG/1
10 TABLET, FILM COATED, EXTENDED RELEASE ORAL 2 TIMES DAILY
Qty: 180 TABLET | Refills: 3 | Status: SHIPPED | OUTPATIENT
Start: 2024-06-21

## 2024-06-21 RX ORDER — CARVEDILOL 25 MG/1
50 TABLET ORAL 2 TIMES DAILY
Qty: 360 TABLET | Refills: 3 | Status: SHIPPED | OUTPATIENT
Start: 2024-06-21

## 2024-06-21 RX ORDER — AMLODIPINE BESYLATE 10 MG/1
10 TABLET ORAL DAILY
Qty: 90 TABLET | Refills: 3 | Status: SHIPPED | OUTPATIENT
Start: 2024-06-21

## 2024-06-21 RX ORDER — METFORMIN HYDROCHLORIDE 500 MG/1
500 TABLET ORAL 2 TIMES DAILY
Qty: 180 TABLET | Refills: 3 | Status: SHIPPED | OUTPATIENT
Start: 2024-06-21 | End: 2025-06-21

## 2024-08-13 ENCOUNTER — TELEPHONE (OUTPATIENT)
Dept: PRIMARY CARE CLINIC | Facility: CLINIC | Age: 49
End: 2024-08-13
Payer: COMMERCIAL

## 2024-08-13 DIAGNOSIS — E11.65 TYPE 2 DIABETES MELLITUS WITH HYPERGLYCEMIA, WITHOUT LONG-TERM CURRENT USE OF INSULIN: Chronic | ICD-10-CM

## 2024-08-13 RX ORDER — DAPAGLIFLOZIN 10 MG/1
10 TABLET, FILM COATED ORAL DAILY
Qty: 90 TABLET | Refills: 3 | Status: SHIPPED | OUTPATIENT
Start: 2024-08-13 | End: 2025-08-13

## 2024-08-13 NOTE — TELEPHONE ENCOUNTER
----- Message from Leta Frederick sent at 8/13/2024  1:10 PM CDT -----  Regarding: refill  Who Called: Humphrey Gallegos    RX Name and Strength:FARXIGA 10 mg tablet     List of preferred pharmacies on file (remove unneeded): [facility] [place]    Patient's Preferred Phone Number on File: [phone]     Additional Information: stated that the pharmacy does not have script. Please advise

## 2024-10-01 ENCOUNTER — CLINICAL SUPPORT (OUTPATIENT)
Dept: PRIMARY CARE CLINIC | Facility: CLINIC | Age: 49
End: 2024-10-01
Payer: COMMERCIAL

## 2024-10-01 DIAGNOSIS — E11.65 TYPE 2 DIABETES MELLITUS WITH HYPERGLYCEMIA, WITHOUT LONG-TERM CURRENT USE OF INSULIN: Chronic | ICD-10-CM

## 2024-10-01 LAB
EST. AVERAGE GLUCOSE BLD GHB EST-MCNC: 180 MG/DL
HBA1C MFR BLD: 7.9 %

## 2024-10-01 PROCEDURE — 36415 COLL VENOUS BLD VENIPUNCTURE: CPT | Mod: ,,, | Performed by: GENERAL PRACTICE

## 2024-10-01 PROCEDURE — 36415 COLL VENOUS BLD VENIPUNCTURE: CPT

## 2024-10-01 PROCEDURE — 83036 HEMOGLOBIN GLYCOSYLATED A1C: CPT | Performed by: GENERAL PRACTICE

## 2024-10-02 NOTE — ASSESSMENT & PLAN NOTE
Component Ref Range & Units 1 d ago  (10/1/24) 3 mo ago  (6/19/24) 6 mo ago  (3/7/24) 10 mo ago  (11/27/23) 1 yr ago  (7/24/23) 1 yr ago  (4/19/23) 2 yr ago  (9/28/22)   Hemoglobin A1c <=7.0 % 7.9 High  7.3 High  8.6 High  8.3 High  8.5 High  8.1 High  8.5 High    Estimated Average Glucose mg/dL 180.0 162.8 200.1 191.5 197.3 185.8 197.3        Still less than optimal control with an A1c level of 7.9%.    Mounjaro dose increased to 7.5 weekly.

## 2024-10-02 NOTE — PROGRESS NOTES
"Subjective:       Patient ID: Humphrey Gallegos is a 49 y.o. male.    Chief Complaint: Diabetes and Follow-up    Established patient, presents to the clinic for a recheck of his diabetes mellitus as well as other chronic conditions.  He does have a history of treated hypertension and dyslipidemia.      Review of Systems   Constitutional: Negative.  Negative for fatigue and fever.   HENT: Negative.  Negative for sore throat and trouble swallowing.    Eyes: Negative.  Negative for redness and visual disturbance.   Respiratory: Negative.  Negative for chest tightness and shortness of breath.    Cardiovascular: Negative.  Negative for chest pain.   Gastrointestinal: Negative.  Negative for abdominal pain, blood in stool and nausea.   Endocrine: Negative.  Negative for cold intolerance, heat intolerance and polyuria.   Genitourinary: Negative.    Musculoskeletal: Negative.  Negative for arthralgias, gait problem and joint swelling.   Integumentary:  Negative for rash. Negative.   Neurological: Negative.  Negative for dizziness, weakness and headaches.   Psychiatric/Behavioral: Negative.  Negative for agitation and dysphoric mood.            Patient Care Team:  Michael Sherman MD as PCP - General (Family Medicine)  Thaddeus Williamson DO as Consulting Physician (Cardiology)  Tim Torres OD (Optometry)  Ruth Ng LPN as Care Coordinator  Objective:   Visit Vitals  /78 (BP Location: Left arm, Patient Position: Sitting)   Pulse 68   Resp 18   Ht 5' 7" (1.702 m)   Wt 84.8 kg (187 lb)   SpO2 99%   BMI 29.29 kg/m²        Physical Exam  Constitutional:       Appearance: Normal appearance.   HENT:      Head: Normocephalic.      Mouth/Throat:      Mouth: Mucous membranes are moist.      Pharynx: Oropharynx is clear.   Eyes:      Conjunctiva/sclera: Conjunctivae normal.      Pupils: Pupils are equal, round, and reactive to light.   Cardiovascular:      Rate and Rhythm: Normal rate and regular rhythm.      Heart sounds: " Normal heart sounds.   Pulmonary:      Effort: Pulmonary effort is normal.      Breath sounds: Normal breath sounds.   Abdominal:      General: Abdomen is flat.      Palpations: Abdomen is soft.   Musculoskeletal:         General: Normal range of motion.      Cervical back: Neck supple.   Skin:     General: Skin is warm and dry.   Neurological:      General: No focal deficit present.      Mental Status: He is alert and oriented to person, place, and time.   Psychiatric:         Mood and Affect: Mood normal.         Behavior: Behavior normal.         Thought Content: Thought content normal.         Judgment: Judgment normal.           Lab Results   Component Value Date     11/27/2023    K 4.2 11/27/2023     11/27/2023    CO2 27 11/27/2023    BUN 22.1 (H) 11/27/2023    CREATININE 1.10 11/27/2023    CALCIUM 9.6 11/27/2023    ALBUMIN 4.2 11/27/2023    BILITOT 0.6 11/27/2023    ALKPHOS 62 11/27/2023    AST 19 11/27/2023    ALT 20 11/27/2023    EGFRNORACEVR >60 11/27/2023     Lab Results   Component Value Date    WBC 5.58 11/27/2023    RBC 5.44 11/27/2023    HGB 15.9 11/27/2023    HCT 47.3 11/27/2023    MCV 86.9 11/27/2023    RDW 13.2 11/27/2023     11/27/2023      Lab Results   Component Value Date    CHOL 239 (H) 11/27/2023    TRIG 218 (H) 11/27/2023    HDL 59 11/27/2023    .00 11/27/2023    TOTALCHOLEST 4 11/27/2023     Lab Results   Component Value Date    TSH 0.822 11/27/2023     Lab Results   Component Value Date    HGBA1C 7.9 (H) 10/01/2024        Lab Results   Component Value Date    PSA 0.50 11/27/2023         Assessment:       ICD-10-CM ICD-9-CM   1. Type 2 diabetes mellitus with hyperglycemia, without long-term current use of insulin  E11.65 250.00     790.29   2. Primary hypertension  I10 401.9   3. Generalized anxiety disorder  F41.1 300.02   4. Dyslipidemia  E78.5 272.4       Current Outpatient Medications   Medication Instructions    amLODIPine (NORVASC) 10 mg, Oral, Daily     blood-glucose meter kit   ACCU-CHEK GUIDE TEST STRIPS 100S, See Instructions, USE TO TEST BLOOD GLUCOSE ONCE DAILY AS DIRECTED, # 100 unknown unit, 2 Refill(s), Pharmacy: [id number], 169, cm, Height/Length Dosing, 12/03/20 14:03:00 CST, 88.5, kg, Weight Dosing...    blood-glucose meter,continuous (DEXCOM G6 ) Misc Used to monitor blood glucose    blood-glucose sensor (DEXCOM G6 SENSOR) Fatimah Used with DEXCOM G6 to monitor blood glucose    blood-glucose transmitter (DEXCOM G6 TRANSMITTER) Fatimah Use with DEXCOM G6 to monitor blood glucose    carvediloL (COREG) 50 mg, Oral, 2 times daily    chlorthalidone (HYGROTEN) 25 mg, Oral, Daily    FARXIGA 10 mg, Oral, Daily    glipiZIDE (GLUCOTROL) 10 mg, Oral, 2 times daily    hydrALAZINE (APRESOLINE) 50 mg, Oral, 2 times daily    metFORMIN (GLUCOPHAGE) 500 mg, Oral, 2 times daily    olmesartan (BENICAR) 40 mg, Oral, Daily    pioglitazone (ACTOS) 45 mg, Oral, Daily    rosuvastatin (CRESTOR) 10 mg, Oral, Daily    tirzepatide 7.5 mg, Subcutaneous, Every 7 days     Plan:     Problem List Items Addressed This Visit          Psychiatric    Generalized anxiety disorder (Chronic)    Overview     Prescribed Zoloft 50 mg daily.     Patient reports stability of moods, and effectiveness of medications, including no agitation, significant somnolence, or significant bouts of anxiety or depression.  Patient is not having any sleep disturbance.  Current treatment plan will continue, with plans to discuss any significant changes in patient's status if necessary if anything changes in the future..    Medication will be continued at current dosage.            Cardiac/Vascular    Primary hypertension (Chronic)    Overview     Prescribed olmesartan 40 mg daily, Coreg 50 mg b.i.d., amlodipine 10 mg daily, chlorthalidone 25 mg daily.    Blood pressures appear to be adequately controlled with current treatment plan, including prescription blood pressure medication.   Medication(s) appear to be effective at current dosages, and are not causing any side effects or problems.  Continue medical management and continue home blood pressure checks.  Average blood pressures at home should be no greater than 130/80.  Patient instructed to contact the clinic if blood pressures become elevated at any point prior to next clinic visit.    Medication will be continued at the current dosage.         Dyslipidemia (Chronic)    Overview     Prescribed Crestor 10 mg daily.      Very mild myalgia, is able to tolerate it, we will check his lipid panel at his next wellness            Endocrine    Type 2 diabetes mellitus with hyperglycemia, without long-term current use of insulin - Primary (Chronic)    Overview     Prescribed Mounjaro 7.5 mg weekly, Farxiga 10 mg daily, glipizide XL 10 mg b.i.d., metformin 500 mg b.i.d., Actos 45 mg daily.    He does have a CGM, it Dexcom, this is helping him in a significant way to achieve better control.               Current Assessment & Plan                Component Ref Range & Units 1 d ago  (10/1/24) 3 mo ago  (6/19/24) 6 mo ago  (3/7/24) 10 mo ago  (11/27/23) 1 yr ago  (7/24/23) 1 yr ago  (4/19/23) 2 yr ago  (9/28/22)   Hemoglobin A1c <=7.0 % 7.9 High  7.3 High  8.6 High  8.3 High  8.5 High  8.1 High  8.5 High    Estimated Average Glucose mg/dL 180.0 162.8 200.1 191.5 197.3 185.8 197.3        Still less than optimal control with an A1c level of 7.9%.    Mounjaro dose increased to 7.5 weekly.         Relevant Medications    tirzepatide 7.5 mg/0.5 mL PnIj               Follow up for next appointment as scheduled or as needed.    This note was created with the assistance of [employer] voice recognition software or phone dictation. There may be transcription errors as a result of using this technology. Effort has been made to assure accuracy of transcription but any obvious errors or omissions should be clarified with the author of the document.

## 2024-10-03 ENCOUNTER — OFFICE VISIT (OUTPATIENT)
Dept: PRIMARY CARE CLINIC | Facility: CLINIC | Age: 49
End: 2024-10-03
Payer: COMMERCIAL

## 2024-10-03 VITALS
HEART RATE: 68 BPM | HEIGHT: 67 IN | OXYGEN SATURATION: 99 % | WEIGHT: 187 LBS | DIASTOLIC BLOOD PRESSURE: 78 MMHG | SYSTOLIC BLOOD PRESSURE: 132 MMHG | RESPIRATION RATE: 18 BRPM | BODY MASS INDEX: 29.35 KG/M2

## 2024-10-03 DIAGNOSIS — E78.5 DYSLIPIDEMIA: Chronic | ICD-10-CM

## 2024-10-03 DIAGNOSIS — E11.65 TYPE 2 DIABETES MELLITUS WITH HYPERGLYCEMIA, WITHOUT LONG-TERM CURRENT USE OF INSULIN: Primary | Chronic | ICD-10-CM

## 2024-10-03 DIAGNOSIS — I10 PRIMARY HYPERTENSION: Chronic | ICD-10-CM

## 2024-10-03 DIAGNOSIS — F41.1 GENERALIZED ANXIETY DISORDER: Chronic | ICD-10-CM

## 2024-12-01 PROBLEM — M79.10 MYALGIA DUE TO STATIN: Chronic | Status: ACTIVE | Noted: 2024-12-01

## 2024-12-01 PROBLEM — T46.6X5A MYALGIA DUE TO STATIN: Chronic | Status: ACTIVE | Noted: 2024-12-01

## 2024-12-05 ENCOUNTER — CLINICAL SUPPORT (OUTPATIENT)
Dept: PRIMARY CARE CLINIC | Facility: CLINIC | Age: 49
End: 2024-12-05
Payer: COMMERCIAL

## 2024-12-05 DIAGNOSIS — E11.65 TYPE 2 DIABETES MELLITUS WITH HYPERGLYCEMIA, WITHOUT LONG-TERM CURRENT USE OF INSULIN: Chronic | ICD-10-CM

## 2024-12-05 DIAGNOSIS — I10 PRIMARY HYPERTENSION: ICD-10-CM

## 2024-12-05 DIAGNOSIS — E78.5 DYSLIPIDEMIA: Chronic | ICD-10-CM

## 2024-12-05 DIAGNOSIS — Z12.5 SCREENING FOR PROSTATE CANCER: ICD-10-CM

## 2024-12-05 LAB
ALBUMIN SERPL-MCNC: 4.3 G/DL (ref 3.5–5)
ALBUMIN/GLOB SERPL: 1.4 RATIO (ref 1.1–2)
ALP SERPL-CCNC: 67 UNIT/L (ref 40–150)
ALT SERPL-CCNC: 26 UNIT/L (ref 0–55)
ANION GAP SERPL CALC-SCNC: 11 MEQ/L
AST SERPL-CCNC: 19 UNIT/L (ref 5–34)
BASOPHILS # BLD AUTO: 0.02 X10(3)/MCL
BASOPHILS NFR BLD AUTO: 0.4 %
BILIRUB SERPL-MCNC: 0.3 MG/DL
BUN SERPL-MCNC: 24.7 MG/DL (ref 8.4–25.7)
CALCIUM SERPL-MCNC: 9.8 MG/DL (ref 8.4–10.2)
CHLORIDE SERPL-SCNC: 103 MMOL/L (ref 98–107)
CHOLEST SERPL-MCNC: 232 MG/DL
CHOLEST/HDLC SERPL: 4 {RATIO} (ref 0–5)
CO2 SERPL-SCNC: 24 MMOL/L (ref 22–29)
CREAT SERPL-MCNC: 1.11 MG/DL (ref 0.72–1.25)
CREAT/UREA NIT SERPL: 22
EOSINOPHIL # BLD AUTO: 0.12 X10(3)/MCL (ref 0–0.9)
EOSINOPHIL NFR BLD AUTO: 2.5 %
ERYTHROCYTE [DISTWIDTH] IN BLOOD BY AUTOMATED COUNT: 12.8 % (ref 11.5–17)
EST. AVERAGE GLUCOSE BLD GHB EST-MCNC: 180 MG/DL
GFR SERPLBLD CREATININE-BSD FMLA CKD-EPI: >60 ML/MIN/1.73/M2
GLOBULIN SER-MCNC: 3 GM/DL (ref 2.4–3.5)
GLUCOSE SERPL-MCNC: 207 MG/DL (ref 74–100)
HBA1C MFR BLD: 7.9 %
HCT VFR BLD AUTO: 47.1 % (ref 42–52)
HDLC SERPL-MCNC: 62 MG/DL (ref 35–60)
HGB BLD-MCNC: 16.2 G/DL (ref 14–18)
IMM GRANULOCYTES # BLD AUTO: 0.01 X10(3)/MCL (ref 0–0.04)
IMM GRANULOCYTES NFR BLD AUTO: 0.2 %
LDLC SERPL CALC-MCNC: 152 MG/DL (ref 50–140)
LYMPHOCYTES # BLD AUTO: 1.44 X10(3)/MCL (ref 0.6–4.6)
LYMPHOCYTES NFR BLD AUTO: 30.3 %
MCH RBC QN AUTO: 29.1 PG (ref 27–31)
MCHC RBC AUTO-ENTMCNC: 34.4 G/DL (ref 33–36)
MCV RBC AUTO: 84.7 FL (ref 80–94)
MONOCYTES # BLD AUTO: 0.51 X10(3)/MCL (ref 0.1–1.3)
MONOCYTES NFR BLD AUTO: 10.7 %
NEUTROPHILS # BLD AUTO: 2.65 X10(3)/MCL (ref 2.1–9.2)
NEUTROPHILS NFR BLD AUTO: 55.9 %
NRBC BLD AUTO-RTO: 0 %
PLATELET # BLD AUTO: 172 X10(3)/MCL (ref 130–400)
PMV BLD AUTO: 11.9 FL (ref 7.4–10.4)
POTASSIUM SERPL-SCNC: 3.7 MMOL/L (ref 3.5–5.1)
PROT SERPL-MCNC: 7.3 GM/DL (ref 6.4–8.3)
PSA SERPL-MCNC: 0.46 NG/ML
RBC # BLD AUTO: 5.56 X10(6)/MCL (ref 4.7–6.1)
SODIUM SERPL-SCNC: 138 MMOL/L (ref 136–145)
TRIGL SERPL-MCNC: 88 MG/DL (ref 34–140)
TSH SERPL-ACNC: 1.39 UIU/ML (ref 0.35–4.94)
VLDLC SERPL CALC-MCNC: 18 MG/DL
WBC # BLD AUTO: 4.75 X10(3)/MCL (ref 4.5–11.5)

## 2024-12-05 PROCEDURE — 80053 COMPREHEN METABOLIC PANEL: CPT | Performed by: GENERAL PRACTICE

## 2024-12-05 PROCEDURE — 84153 ASSAY OF PSA TOTAL: CPT | Performed by: GENERAL PRACTICE

## 2024-12-05 PROCEDURE — 85025 COMPLETE CBC W/AUTO DIFF WBC: CPT | Performed by: GENERAL PRACTICE

## 2024-12-05 PROCEDURE — 80061 LIPID PANEL: CPT | Performed by: GENERAL PRACTICE

## 2024-12-05 PROCEDURE — 36415 COLL VENOUS BLD VENIPUNCTURE: CPT

## 2024-12-05 PROCEDURE — 83036 HEMOGLOBIN GLYCOSYLATED A1C: CPT | Performed by: GENERAL PRACTICE

## 2024-12-05 PROCEDURE — 84443 ASSAY THYROID STIM HORMONE: CPT | Performed by: GENERAL PRACTICE

## 2024-12-05 NOTE — PROGRESS NOTES
Patient presented for nurse visit/Blood draw. 22g needle X 1 attempt in left outer antecubital.  Patient tolerated procedure well.

## 2024-12-08 NOTE — PROGRESS NOTES
"Subjective:       Patient ID: Humphrey Gallegos is a 49 y.o. male.    Chief Complaint: Annual Exam    Patient presents to the clinic today for wellness examination.  Current and past medical history reviewed  Pertinent family and social history reviewed    Colorectal cancer screening:  CRC screening reviewed  Prostate CA screening:  Prostate CA screening reviewed  Vaccinations due:  All vaccinations due and/or desired have been addressed and given.    No complaints today.        Review of Systems   Constitutional: Negative.  Negative for fatigue and fever.   HENT: Negative.  Negative for sore throat and trouble swallowing.    Eyes: Negative.  Negative for redness and visual disturbance.   Respiratory: Negative.  Negative for chest tightness and shortness of breath.    Cardiovascular: Negative.  Negative for chest pain.   Gastrointestinal: Negative.  Negative for abdominal pain, blood in stool and nausea.   Endocrine: Negative.  Negative for cold intolerance, heat intolerance and polyuria.   Genitourinary: Negative.    Musculoskeletal: Negative.  Negative for arthralgias, gait problem and joint swelling.   Integumentary:  Negative for rash. Negative.   Neurological: Negative.  Negative for dizziness, weakness and headaches.   Psychiatric/Behavioral: Negative.  Negative for agitation and dysphoric mood.            Patient Care Team:  Michael Sherman MD as PCP - General (Family Medicine)  Thaddeus Williamson DO as Consulting Physician (Cardiology)  Tim Torres OD (Optometry)  Ruth Ng LPN as Care Coordinator  Objective:   Visit Vitals  /86 (Patient Position: Sitting)   Pulse 60   Ht 5' 7" (1.702 m)   Wt 87.4 kg (192 lb 9.6 oz)   BMI 30.17 kg/m²        Physical Exam  Constitutional:       Appearance: Normal appearance.   HENT:      Head: Normocephalic.      Mouth/Throat:      Mouth: Mucous membranes are moist.      Pharynx: Oropharynx is clear.   Eyes:      Conjunctiva/sclera: Conjunctivae normal.      " Pupils: Pupils are equal, round, and reactive to light.   Cardiovascular:      Rate and Rhythm: Normal rate and regular rhythm.      Heart sounds: Normal heart sounds.   Pulmonary:      Effort: Pulmonary effort is normal.      Breath sounds: Normal breath sounds.   Abdominal:      General: Abdomen is flat.      Palpations: Abdomen is soft.   Musculoskeletal:         General: Normal range of motion.      Cervical back: Neck supple.   Skin:     General: Skin is warm and dry.   Neurological:      General: No focal deficit present.      Mental Status: He is alert and oriented to person, place, and time.   Psychiatric:         Mood and Affect: Mood normal.         Behavior: Behavior normal.         Thought Content: Thought content normal.         Judgment: Judgment normal.       Diabetic foot exam:  Examination performed by myself, Dr. Sherman    Examination of the skin/nails:  Bilateral examination of the feet reveals no significant skin lesions/abnormalities, ulcerations, ulcers, nail abnormalities.  Patient does appear to be using appropriate footwear.    Peripheral pulses:  Dorsalis pedis pulse-normal bilaterally  Posterior tibialis pulse-normal bilaterally  Appropriate amount of foot hair, capillary refill normal    Five point sensation test:  Left foot sensation test-no significant loss of sensation at all points tested  Right foot to cessation test-no significant loss of sensation at all points tested    Foot exam risk category:  0-2 (none or minimal loss of protective sensation with no additional findings)    Foot exam intervention:  Foot care education done at appropriate risk level      Lab Results   Component Value Date     12/05/2024    K 3.7 12/05/2024     12/05/2024    CO2 24 12/05/2024    BUN 24.7 12/05/2024    CREATININE 1.11 12/05/2024    CALCIUM 9.8 12/05/2024    ALBUMIN 4.3 12/05/2024    BILITOT 0.3 12/05/2024    ALKPHOS 67 12/05/2024    AST 19 12/05/2024    ALT 26 12/05/2024    EGFRNORACEVR  >60 12/05/2024     Lab Results   Component Value Date    WBC 4.75 12/05/2024    RBC 5.56 12/05/2024    HGB 16.2 12/05/2024    HCT 47.1 12/05/2024    MCV 84.7 12/05/2024    RDW 12.8 12/05/2024     12/05/2024      Lab Results   Component Value Date    CHOL 232 (H) 12/05/2024    TRIG 88 12/05/2024    HDL 62 (H) 12/05/2024    .00 (H) 12/05/2024    TOTALCHOLEST 4 12/05/2024     Lab Results   Component Value Date    TSH 1.390 12/05/2024     Lab Results   Component Value Date    HGBA1C 7.9 (H) 12/05/2024        Lab Results   Component Value Date    PSA 0.46 12/05/2024         Assessment:       ICD-10-CM ICD-9-CM   1. Wellness examination  Z00.00 V70.0   2. Screening for prostate cancer  Z12.5 V76.44   3. Primary hypertension  I10 401.9   4. Type 2 diabetes mellitus with hyperglycemia, without long-term current use of insulin  E11.65 250.00     790.29   5. Dyslipidemia  E78.5 272.4   6. Myalgia due to statin  M79.10 729.1    T46.6X5A E942.2   7. Generalized anxiety disorder  F41.1 300.02   8. Family history of coronary artery disease  Z82.49 V17.3   9. Encounter for screening for cardiovascular disorders  Z13.6 V81.2   10. Class 1 obesity due to excess calories with serious comorbidity and body mass index (BMI) of 30.0 to 30.9 in adult  E66.811 278.00    E66.09 V85.30    Z68.30        Current Outpatient Medications   Medication Instructions    amLODIPine (NORVASC) 10 mg, Oral, Daily    blood-glucose meter kit   ACCU-CHEK GUIDE TEST STRIPS 100S, See Instructions, USE TO TEST BLOOD GLUCOSE ONCE DAILY AS DIRECTED, # 100 unknown unit, 2 Refill(s), Pharmacy: [facility] #[id number], 169, cm, Height/Length Dosing, 12/03/20 14:03:00 CST, 88.5, kg, Weight Dosing...    blood-glucose meter,continuous (DEXCOM G6 ) Misc Used to monitor blood glucose    blood-glucose sensor (DEXCOM G6 SENSOR) Fatimah Used with DEXCOM G6 to monitor blood glucose    blood-glucose transmitter (DEXCOM G6 TRANSMITTER) Fatimah Use with  DEXCOM G6 to monitor blood glucose    carvediloL (COREG) 50 mg, Oral, 2 times daily    chlorthalidone (HYGROTEN) 50 mg, Oral, Daily    FARXIGA 10 mg, Oral, Daily    glipiZIDE (GLUCOTROL) 10 mg, Oral, 2 times daily    hydrALAZINE (APRESOLINE) 50 mg, Oral, 2 times daily    metFORMIN (GLUCOPHAGE) 500 mg, Oral, 2 times daily    olmesartan (BENICAR) 40 mg, Oral, Daily    pioglitazone (ACTOS) 45 mg, Oral, Daily    rosuvastatin (CRESTOR) 10 mg, Oral, Daily    tirzepatide 10 mg, Subcutaneous, Every 7 days     Plan:     Problem List Items Addressed This Visit          Psychiatric    Generalized anxiety disorder (Chronic)    Overview     Prescribed Zoloft 50 mg daily.     Patient reports stability of moods, and effectiveness of medications, including no agitation, significant somnolence, or significant bouts of anxiety or depression.  Patient is not having any sleep disturbance.  Current treatment plan will continue, with plans to discuss any significant changes in patient's status if necessary if anything changes in the future..    Medication will be continued at current dosage.            Cardiac/Vascular    Primary hypertension (Chronic)    Overview     Prescribed olmesartan 40 mg daily, Coreg 50 mg b.i.d., amlodipine 10 mg daily, chlorthalidone 50 mg daily.    Chlorthalidone dose increased on 12/09/2024.  BNP in six months.    Blood pressures from home show less than optimal control, systolics high 130s, diastolics occasionally in the 90s, patient almost on maximum dosages of every medication that he is taking.          Relevant Medications    hydrALAZINE (APRESOLINE) 50 MG tablet    chlorthalidone (HYGROTEN) 50 MG Tab    Other Relevant Orders    Basic Metabolic Panel    Dyslipidemia (Chronic)    Overview     Prescribed Crestor 10 mg daily.      Very mild myalgia, is able to tolerate it, we will check his lipid panel at his next wellness.    This is the maximum dose of Crestor that he can tolerate, and he continues to  have fairly significant lipid elevation, he may be a candidate for Repatha.         Current Assessment & Plan                Component Ref Range & Units 3 d ago 1 yr ago 2 yr ago 3 yr ago 4 yr ago 5 yr ago 6 yr ago   Cholesterol Total <=200 mg/dL 232 High  239 High  206 High  171 R 202 High  R 168 R 177 R   HDL Cholesterol 35 - 60 mg/dL 62 High  59 58 56 56 70 High  68 High    Triglyceride 34 - 140 mg/dL 88 218 High  206 High  147 High  90 130 R 99 R   Cholesterol/HDL Ratio 0 - 5 4 4 4 3 4 2.4 R 2.6 R   Very Low Density Lipoprotein  18 44 41 29 18 26 R 20 R   LDL Cholesterol 50.00 - 140.00 mg/dL 152.00 High  136.00 107.00 86.00 128.00 72 R 89 R               Relevant Medications    rosuvastatin (CRESTOR) 10 MG tablet    Family history of coronary artery disease (Chronic)    Overview     Family members with known history of coronary artery disease:  Father with MI around 66 years old.    Modifiable cardiovascular risk factors that have been addressed and are being actively monitored and/or treated are as follows:    HTN, dyslipidemia, diabetes mellitus.           Current Assessment & Plan     Calcium score ordered December 2024.            Endocrine    Type 2 diabetes mellitus with hyperglycemia, without long-term current use of insulin (Chronic)    Overview     Prescribed Mounjaro 10 mg weekly, Farxiga 10 mg daily, glipizide XL 10 mg b.i.d., metformin 500 mg b.i.d., Actos 45 mg daily.    Mounjaro increased to 10 mg on 12/09/2024.    He does have a CGM, it Dexcom, this is helping him in a significant way to achieve better control.    On 12/05/2024, A1c 7.9%, unchanged from about two months ago, slightly increased from 7.3% six months ago.    Patient does have diabetes mellitus resistant to treatment, we will continue to attempt to manage this effectively.         Current Assessment & Plan                Component Ref Range & Units 3 d ago  (12/5/24) 2 mo ago  (10/1/24) 5 mo ago  (6/19/24) 9 mo ago  (3/7/24) 1 yr  "ago  (11/27/23) 1 yr ago  (7/24/23) 1 yr ago  (4/19/23)   Hemoglobin A1c <=7.0 % 7.9 High  7.9 High  7.3 High  8.6 High  8.3 High  8.5 High  8.1 High    Estimated Average Glucose mg/dL 180.0 180.0 162.8 200.1 191.5 197.3 185.8               Relevant Medications    tirzepatide 10 mg/0.5 mL PnIj    Other Relevant Orders    Hemoglobin A1C    Microalbumin/Creatinine Ratio, Urine    Class 1 obesity due to excess calories with serious comorbidity and body mass index (BMI) of 30.0 to 30.9 in adult (Chronic)    Overview     Patient continues to have a BMI that falls in the obesity range.    Weight loss, healthy dietary choices, increased activity/exercise are recommended.  To lose weight, it is generally recommended to restrict calories to approximately 1500 calories per day to lose 1 lb per week, and approximately 1200 calories per day to lose up to 2 lb per week.  Generally, this is a healthy amount of weight to lose, and an appropriate amount of time to lose this amount of weight.  Adding exercise will assist in losing weight, particularly aerobic exercise such as walking.  However, resistance training, or lifting weights" over time may assistant weight loss by increasing basal metabolic rate, or the rate at which your body burns calories during periods of inactivity.    Keep track of BMI (body mass index), below 25 is considered normal, over 25 but below 30 is considered overweight, anything over 30 is considered moderately obese, over 35 severely obese, and over 40 is characterized as morbidly obese.            Orthopedic    Myalgia due to statin (Chronic)    Overview     Able to tolerate Crestor 10 mg daily.          Other Visit Diagnoses       Wellness examination    -  Primary    Overall health status was reviewed.  Good health habits reinforced.  Annual wellness exams recommended.    Screening for prostate cancer        Prostate specific antigen blood test obtained was essentially normal, suggesting that there is " no current concern for prostate cancer.  Digital exam deferred.    Encounter for screening for cardiovascular disorders        Relevant Orders    CT Calcium Scoring Cardiac                    Follow up in about 6 months (around 6/9/2025) for Extended chronic condition F/up.    This note was created with the assistance of [employer] voice recognition software or phone dictation. There may be transcription errors as a result of using this technology. Effort has been made to assure accuracy of transcription but any obvious errors or omissions should be clarified with the author of the document.

## 2024-12-08 NOTE — ASSESSMENT & PLAN NOTE
Component Ref Range & Units 3 d ago 1 yr ago 2 yr ago 3 yr ago 4 yr ago 5 yr ago 6 yr ago   Cholesterol Total <=200 mg/dL 232 High  239 High  206 High  171 R 202 High  R 168 R 177 R   HDL Cholesterol 35 - 60 mg/dL 62 High  59 58 56 56 70 High  68 High    Triglyceride 34 - 140 mg/dL 88 218 High  206 High  147 High  90 130 R 99 R   Cholesterol/HDL Ratio 0 - 5 4 4 4 3 4 2.4 R 2.6 R   Very Low Density Lipoprotein  18 44 41 29 18 26 R 20 R   LDL Cholesterol 50.00 - 140.00 mg/dL 152.00 High  136.00 107.00 86.00 128.00 72 R 89 R

## 2024-12-08 NOTE — ASSESSMENT & PLAN NOTE
Component Ref Range & Units 3 d ago  (12/5/24) 2 mo ago  (10/1/24) 5 mo ago  (6/19/24) 9 mo ago  (3/7/24) 1 yr ago  (11/27/23) 1 yr ago  (7/24/23) 1 yr ago  (4/19/23)   Hemoglobin A1c <=7.0 % 7.9 High  7.9 High  7.3 High  8.6 High  8.3 High  8.5 High  8.1 High    Estimated Average Glucose mg/dL 180.0 180.0 162.8 200.1 191.5 197.3 185.8

## 2024-12-09 ENCOUNTER — OFFICE VISIT (OUTPATIENT)
Dept: PRIMARY CARE CLINIC | Facility: CLINIC | Age: 49
End: 2024-12-09
Payer: COMMERCIAL

## 2024-12-09 VITALS
HEIGHT: 67 IN | WEIGHT: 192.63 LBS | BODY MASS INDEX: 30.24 KG/M2 | HEART RATE: 60 BPM | DIASTOLIC BLOOD PRESSURE: 86 MMHG | SYSTOLIC BLOOD PRESSURE: 137 MMHG

## 2024-12-09 DIAGNOSIS — E66.811 CLASS 1 OBESITY DUE TO EXCESS CALORIES WITH SERIOUS COMORBIDITY AND BODY MASS INDEX (BMI) OF 30.0 TO 30.9 IN ADULT: ICD-10-CM

## 2024-12-09 DIAGNOSIS — E66.09 CLASS 1 OBESITY DUE TO EXCESS CALORIES WITH SERIOUS COMORBIDITY AND BODY MASS INDEX (BMI) OF 30.0 TO 30.9 IN ADULT: ICD-10-CM

## 2024-12-09 DIAGNOSIS — T46.6X5A MYALGIA DUE TO STATIN: Chronic | ICD-10-CM

## 2024-12-09 DIAGNOSIS — Z82.49 FAMILY HISTORY OF CORONARY ARTERY DISEASE: Chronic | ICD-10-CM

## 2024-12-09 DIAGNOSIS — M79.10 MYALGIA DUE TO STATIN: Chronic | ICD-10-CM

## 2024-12-09 DIAGNOSIS — I10 PRIMARY HYPERTENSION: Chronic | ICD-10-CM

## 2024-12-09 DIAGNOSIS — F41.1 GENERALIZED ANXIETY DISORDER: Chronic | ICD-10-CM

## 2024-12-09 DIAGNOSIS — Z13.6 ENCOUNTER FOR SCREENING FOR CARDIOVASCULAR DISORDERS: ICD-10-CM

## 2024-12-09 DIAGNOSIS — Z12.5 SCREENING FOR PROSTATE CANCER: ICD-10-CM

## 2024-12-09 DIAGNOSIS — E78.5 DYSLIPIDEMIA: Chronic | ICD-10-CM

## 2024-12-09 DIAGNOSIS — E11.65 TYPE 2 DIABETES MELLITUS WITH HYPERGLYCEMIA, WITHOUT LONG-TERM CURRENT USE OF INSULIN: Chronic | ICD-10-CM

## 2024-12-09 DIAGNOSIS — Z00.00 WELLNESS EXAMINATION: Primary | ICD-10-CM

## 2024-12-09 PROCEDURE — 3066F NEPHROPATHY DOC TX: CPT | Mod: CPTII,,, | Performed by: GENERAL PRACTICE

## 2024-12-09 PROCEDURE — 3079F DIAST BP 80-89 MM HG: CPT | Mod: CPTII,,, | Performed by: GENERAL PRACTICE

## 2024-12-09 PROCEDURE — 3051F HG A1C>EQUAL 7.0%<8.0%: CPT | Mod: CPTII,,, | Performed by: GENERAL PRACTICE

## 2024-12-09 PROCEDURE — 1160F RVW MEDS BY RX/DR IN RCRD: CPT | Mod: CPTII,,, | Performed by: GENERAL PRACTICE

## 2024-12-09 PROCEDURE — 4010F ACE/ARB THERAPY RXD/TAKEN: CPT | Mod: CPTII,,, | Performed by: GENERAL PRACTICE

## 2024-12-09 PROCEDURE — 99396 PREV VISIT EST AGE 40-64: CPT | Mod: ,,, | Performed by: GENERAL PRACTICE

## 2024-12-09 PROCEDURE — 3061F NEG MICROALBUMINURIA REV: CPT | Mod: CPTII,,, | Performed by: GENERAL PRACTICE

## 2024-12-09 PROCEDURE — 1159F MED LIST DOCD IN RCRD: CPT | Mod: CPTII,,, | Performed by: GENERAL PRACTICE

## 2024-12-09 PROCEDURE — 3075F SYST BP GE 130 - 139MM HG: CPT | Mod: CPTII,,, | Performed by: GENERAL PRACTICE

## 2024-12-09 PROCEDURE — 3008F BODY MASS INDEX DOCD: CPT | Mod: CPTII,,, | Performed by: GENERAL PRACTICE

## 2024-12-09 RX ORDER — HYDRALAZINE HYDROCHLORIDE 50 MG/1
50 TABLET, FILM COATED ORAL 2 TIMES DAILY
Qty: 180 TABLET | Refills: 3 | Status: SHIPPED | OUTPATIENT
Start: 2024-12-09 | End: 2025-12-09

## 2024-12-09 RX ORDER — CHLORTHALIDONE 50 MG/1
50 TABLET ORAL DAILY
Qty: 90 TABLET | Refills: 3 | Status: SHIPPED | OUTPATIENT
Start: 2024-12-09 | End: 2025-12-09

## 2024-12-09 RX ORDER — ROSUVASTATIN CALCIUM 10 MG/1
10 TABLET, COATED ORAL DAILY
Qty: 30 TABLET | Refills: 11 | Status: SHIPPED | OUTPATIENT
Start: 2024-12-09 | End: 2025-12-09

## 2024-12-12 ENCOUNTER — DOCUMENTATION ONLY (OUTPATIENT)
Dept: PRIMARY CARE CLINIC | Facility: CLINIC | Age: 49
End: 2024-12-12
Payer: COMMERCIAL

## 2024-12-12 ENCOUNTER — TELEPHONE (OUTPATIENT)
Dept: PRIMARY CARE CLINIC | Facility: CLINIC | Age: 49
End: 2024-12-12
Payer: COMMERCIAL

## 2024-12-12 NOTE — TELEPHONE ENCOUNTER
----- Message from Mamta sent at 12/12/2024  7:49 AM CST -----  Regarding: call in med  .Type:  Needs Medical Advice    Who Called: pt  Symptoms (please be specific): sinus   How long has patient had these symptoms:  1 wk  Pharmacy name and phone #:  [facility] #[id number] - [place], [place] - [address] [place] [facility] [place]   Phone: [phone]  Fax: [phone]  Would the patient rather a call back or a response via [facility]?   Best Call Back Number:  [phone]  Additional Information:

## 2024-12-12 NOTE — TELEPHONE ENCOUNTER
----- Message from Carlo sent at 12/12/2024  3:41 PM CST -----  .Who Called: Humphrey El    Patient is returning phone call    Who Left Message for Patient:Sanam Taylor MA  Does the patient know what this is regarding?:      Preferred Method of Contact: Phone Call  Patient's Preferred Phone Number on File: [phone]   Best Call Back Number, if different:  Additional Information:

## 2024-12-17 DIAGNOSIS — J01.00 ACUTE NON-RECURRENT MAXILLARY SINUSITIS: Primary | ICD-10-CM

## 2024-12-17 RX ORDER — AZITHROMYCIN 250 MG/1
TABLET, FILM COATED ORAL
Qty: 6 TABLET | Refills: 0 | Status: SHIPPED | OUTPATIENT
Start: 2024-12-17

## 2024-12-17 RX ORDER — CODEINE PHOSPHATE AND GUAIFENESIN 10; 100 MG/5ML; MG/5ML
10 SOLUTION ORAL EVERY 6 HOURS PRN
Qty: 180 ML | Refills: 0 | Status: SHIPPED | OUTPATIENT
Start: 2024-12-17 | End: 2024-12-27

## 2025-03-12 NOTE — TELEPHONE ENCOUNTER
Left vm for pt that he would have to be seen again due to medical history. Advised we could do a e-visit for medication. Pt told to call back if he had any questions or conerns.   12-Mar-2025 13:49

## 2025-05-01 ENCOUNTER — DOCUMENTATION ONLY (OUTPATIENT)
Dept: PRIMARY CARE CLINIC | Facility: CLINIC | Age: 50
End: 2025-05-01
Payer: COMMERCIAL

## 2025-05-01 LAB
LEFT EYE DM RETINOPATHY: POSITIVE
RIGHT EYE DM RETINOPATHY: POSITIVE

## 2025-05-06 DIAGNOSIS — E11.65 TYPE 2 DIABETES MELLITUS WITH HYPERGLYCEMIA, WITHOUT LONG-TERM CURRENT USE OF INSULIN: Chronic | ICD-10-CM

## 2025-05-06 RX ORDER — BLOOD-GLUCOSE TRANSMITTER
EACH MISCELLANEOUS
Qty: 1 EACH | Refills: 11 | Status: SHIPPED | OUTPATIENT
Start: 2025-05-06

## 2025-06-02 DIAGNOSIS — E11.65 TYPE 2 DIABETES MELLITUS WITH HYPERGLYCEMIA, WITHOUT LONG-TERM CURRENT USE OF INSULIN: Primary | Chronic | ICD-10-CM

## 2025-06-02 RX ORDER — PIOGLITAZONE 45 MG/1
45 TABLET ORAL DAILY
Qty: 90 TABLET | Refills: 3 | Status: SHIPPED | OUTPATIENT
Start: 2025-06-02 | End: 2026-06-02

## 2025-06-06 ENCOUNTER — CLINICAL SUPPORT (OUTPATIENT)
Dept: PRIMARY CARE CLINIC | Facility: CLINIC | Age: 50
End: 2025-06-06
Payer: COMMERCIAL

## 2025-06-06 DIAGNOSIS — I10 PRIMARY HYPERTENSION: Chronic | ICD-10-CM

## 2025-06-06 DIAGNOSIS — E11.65 TYPE 2 DIABETES MELLITUS WITH HYPERGLYCEMIA, WITHOUT LONG-TERM CURRENT USE OF INSULIN: Chronic | ICD-10-CM

## 2025-06-06 LAB
ANION GAP SERPL CALC-SCNC: 10 MEQ/L
BUN SERPL-MCNC: 24.1 MG/DL (ref 8.4–25.7)
CALCIUM SERPL-MCNC: 9.2 MG/DL (ref 8.4–10.2)
CHLORIDE SERPL-SCNC: 102 MMOL/L (ref 98–107)
CO2 SERPL-SCNC: 26 MMOL/L (ref 22–29)
CREAT SERPL-MCNC: 1.1 MG/DL (ref 0.72–1.25)
CREAT UR-MCNC: 71 MG/DL (ref 63–166)
CREAT/UREA NIT SERPL: 22
EST. AVERAGE GLUCOSE BLD GHB EST-MCNC: 200.1 MG/DL
GFR SERPLBLD CREATININE-BSD FMLA CKD-EPI: >60 ML/MIN/1.73/M2
GLUCOSE SERPL-MCNC: 236 MG/DL (ref 74–100)
HBA1C MFR BLD: 8.6 %
MICROALBUMIN UR-MCNC: <5 UG/ML
MICROALBUMIN/CREAT RATIO PNL UR: NORMAL
POTASSIUM SERPL-SCNC: 3.8 MMOL/L (ref 3.5–5.1)
SODIUM SERPL-SCNC: 138 MMOL/L (ref 136–145)

## 2025-06-06 PROCEDURE — 82043 UR ALBUMIN QUANTITATIVE: CPT | Performed by: GENERAL PRACTICE

## 2025-06-06 PROCEDURE — 83036 HEMOGLOBIN GLYCOSYLATED A1C: CPT | Performed by: GENERAL PRACTICE

## 2025-06-06 PROCEDURE — 80048 BASIC METABOLIC PNL TOTAL CA: CPT | Performed by: GENERAL PRACTICE

## 2025-06-09 ENCOUNTER — RESULTS FOLLOW-UP (OUTPATIENT)
Dept: PRIMARY CARE CLINIC | Facility: CLINIC | Age: 50
End: 2025-06-09

## 2025-06-09 NOTE — ASSESSMENT & PLAN NOTE
Component  Ref Range & Units (hover) 3 d ago  (6/6/25) 6 mo ago  (12/5/24) 8 mo ago  (10/1/24) 11 mo ago  (6/19/24) 1 yr ago  (3/7/24) 1 yr ago  (11/27/23) 1 yr ago  (7/24/23)   Hemoglobin A1c 8.6 High  7.9 High  7.9 High  7.3 High  8.6 High  8.3 High  8.5 High    Estimated Average Glucose 200.1 180.0 180.0 162.8 200.1 191.5 197.3        06/10/2025:  Patient reports being noncompliant with diet, not wearing his Dexcom, and not doing his Tirzepatide shots on a regular basis.  He will try to do better, we will recheck his A1c at his wellness exam in six months.

## 2025-06-09 NOTE — PROGRESS NOTES
"Subjective:       Patient ID: Humphrey Gallegos is a 50 y.o. male.    Chief Complaint: Hypertension and Diabetes    Patient presents to the clinic today for chronic condition follow-up.  Doing well, no specific complaints, tolerating all medications, reporting no significant side effects or problems.     Last wellness exam was 12/09/2024.     Chronic conditions being treated include but are not limited to primary hypertension, dyslipidemia, diabetes mellitus, generalized anxiety disorder.        Review of Systems   Constitutional: Negative.  Negative for fatigue and fever.   HENT: Negative.  Negative for sore throat and trouble swallowing.    Eyes: Negative.  Negative for redness and visual disturbance.   Respiratory: Negative.  Negative for chest tightness and shortness of breath.    Cardiovascular: Negative.  Negative for chest pain.   Gastrointestinal: Negative.  Negative for abdominal pain, blood in stool and nausea.   Endocrine: Negative.  Negative for cold intolerance, heat intolerance and polyuria.   Genitourinary: Negative.    Musculoskeletal: Negative.  Negative for arthralgias, gait problem and joint swelling.   Integumentary:  Negative for rash. Negative.   Neurological: Negative.  Negative for dizziness, weakness and headaches.   Psychiatric/Behavioral: Negative.  Negative for agitation and dysphoric mood.            Patient Care Team:  Michael Sherman MD as PCP - General (Family Medicine)  Thaddeus Williamson DO as Consulting Physician (Cardiology)  Tim Torres OD (Optometry)  Ruth Ng LPN as Care Coordinator  Objective:   Visit Vitals  /88   Pulse 61   Resp 18   Ht 5' 7" (1.702 m)   Wt 86.2 kg (190 lb)   SpO2 99%   BMI 29.76 kg/m²        Physical Exam  Constitutional:       Appearance: Normal appearance.   HENT:      Head: Normocephalic.      Mouth/Throat:      Mouth: Mucous membranes are moist.      Pharynx: Oropharynx is clear.   Eyes:      Pupils: Pupils are equal, round, and reactive to " light.   Cardiovascular:      Rate and Rhythm: Normal rate and regular rhythm.   Pulmonary:      Effort: Pulmonary effort is normal.      Breath sounds: Normal breath sounds.   Abdominal:      Palpations: Abdomen is soft.   Musculoskeletal:         General: Normal range of motion.   Skin:     General: Skin is warm and dry.   Neurological:      General: No focal deficit present.      Mental Status: He is alert.   Psychiatric:         Mood and Affect: Mood normal.         Behavior: Behavior normal.         Thought Content: Thought content normal.         Judgment: Judgment normal.           Lab Results   Component Value Date     (H) 06/06/2025     06/06/2025    K 3.8 06/06/2025     06/06/2025    CO2 26 06/06/2025    BUN 24.1 06/06/2025    CREATININE 1.10 06/06/2025    CALCIUM 9.2 06/06/2025    PROT 7.3 12/05/2024    ALBUMIN 4.3 12/05/2024    BILITOT 0.3 12/05/2024    ALKPHOS 67 12/05/2024    AST 19 12/05/2024    ALT 26 12/05/2024    EGFRNORACEVR >60 06/06/2025     Lab Results   Component Value Date    WBC 4.75 12/05/2024    RBC 5.56 12/05/2024    HGB 16.2 12/05/2024    HCT 47.1 12/05/2024    MCV 84.7 12/05/2024    RDW 12.8 12/05/2024     12/05/2024      Lab Results   Component Value Date    CHOL 232 (H) 12/05/2024    TRIG 88 12/05/2024    HDL 62 (H) 12/05/2024    .00 (H) 12/05/2024    TOTALCHOLEST 4 12/05/2024     Lab Results   Component Value Date    TSH 1.390 12/05/2024     Lab Results   Component Value Date    HGBA1C 8.6 (H) 06/06/2025        Lab Results   Component Value Date    PSA 0.46 12/05/2024         Assessment:       ICD-10-CM ICD-9-CM   1. Primary hypertension  I10 401.9   2. Type 2 diabetes mellitus with hyperglycemia, without long-term current use of insulin  E11.65 250.00     790.29   3. Dyslipidemia  E78.5 272.4   4. Family history of coronary artery disease  Z82.49 V17.3   5. Encounter for screening for cardiovascular disorders  Z13.6 V81.2   6. Overweight (BMI  25.0-29.9)  E66.3 278.02   7. Screening for prostate cancer  Z12.5 V76.44       Current Outpatient Medications   Medication Instructions    amLODIPine (NORVASC) 10 mg, Oral, Daily    blood-glucose meter,continuous (DEXCOM G6 ) Misc Used to monitor blood glucose    blood-glucose sensor (DEXCOM G6 SENSOR) Fatimah Used with DEXCOM G6 to monitor blood glucose    blood-glucose transmitter (DEXCOM G6 TRANSMITTER) Fatimah Use with DEXCOM G6 to monitor blood glucose    carvediloL (COREG) 50 mg, Oral, 2 times daily    chlorthalidone (HYGROTEN) 50 mg, Oral, Daily    FARXIGA 10 mg, Oral, Daily    glipiZIDE (GLUCOTROL) 10 mg, Oral, 2 times daily    hydrALAZINE (APRESOLINE) 50 mg, Oral, 2 times daily    metFORMIN (GLUCOPHAGE) 500 mg, Oral, 2 times daily    olmesartan (BENICAR) 40 mg, Oral, Daily    pioglitazone (ACTOS) 45 mg, Oral, Daily    rosuvastatin (CRESTOR) 10 mg, Oral, Daily    tirzepatide 10 mg, Subcutaneous, Every 7 days     Plan:     Problem List Items Addressed This Visit          Cardiac/Vascular    Primary hypertension - Primary (Chronic)    Overview   Prescribed olmesartan 40 mg daily, Coreg 50 mg b.i.d., amlodipine 10 mg daily, chlorthalidone 50 mg daily.    Chlorthalidone dose increased on 12/09/2024.  BNP in six months.    Blood pressures from home show less than optimal control, systolics high 130s, diastolics occasionally in the 90s, patient almost on maximum dosages of every medication that he is taking.          Relevant Medications    olmesartan (BENICAR) 40 MG tablet    carvediloL (COREG) 25 MG tablet    amLODIPine (NORVASC) 10 MG tablet    Other Relevant Orders    Comprehensive Metabolic Panel    CBC Auto Differential    TSH    Dyslipidemia (Chronic)    Overview   Prescribed Crestor 10 mg daily.      Very mild myalgia, is able to tolerate it, we will check his lipid panel at his next wellness.    This is the maximum dose of Crestor that he can tolerate, and he continues to have fairly significant lipid  elevation, he may be a candidate for Repatha.         Relevant Orders    Lipid Panel    Family history of coronary artery disease (Chronic)    Overview   Family members with known history of coronary artery disease:  Father with MI around 66 years old.    Modifiable cardiovascular risk factors that have been addressed and are being actively monitored and/or treated are as follows:    HTN, dyslipidemia, diabetes mellitus.              Endocrine    Type 2 diabetes mellitus with hyperglycemia, without long-term current use of insulin (Chronic)    Overview   Prescribed Mounjaro 10 mg weekly, Farxiga 10 mg daily, glipizide XL 10 mg b.i.d., metformin 500 mg b.i.d., Actos 45 mg daily.    He does have a CGM, Dexcom.         Current Assessment & Plan             Component  Ref Range & Units (hover) 3 d ago  (6/6/25) 6 mo ago  (12/5/24) 8 mo ago  (10/1/24) 11 mo ago  (6/19/24) 1 yr ago  (3/7/24) 1 yr ago  (11/27/23) 1 yr ago  (7/24/23)   Hemoglobin A1c 8.6 High  7.9 High  7.9 High  7.3 High  8.6 High  8.3 High  8.5 High    Estimated Average Glucose 200.1 180.0 180.0 162.8 200.1 191.5 197.3        06/10/2025:  Patient reports being noncompliant with diet, not wearing his Dexcom, and not doing his Tirzepatide shots on a regular basis.  He will try to do better, we will recheck his A1c at his wellness exam in six months.         Relevant Medications    metFORMIN (GLUCOPHAGE) 500 MG tablet    glipiZIDE (GLUCOTROL) 10 MG TR24    Other Relevant Orders    Hemoglobin A1C    Overweight (BMI 25.0-29.9) (Chronic)    Overview   Weight loss, healthy dietary choices, increased activity and exercise are all recommended.          Other Visit Diagnoses         Encounter for screening for cardiovascular disorders        Ordered 06/10/2025.    Relevant Orders    CT Calcium Scoring Cardiac      Screening for prostate cancer        This diagnosis does not apply to this visit, appropriate prostate cancer screening will be ordered for next  visit/wellness exam.                    Follow up in about 6 months (around 12/10/2025) for Wellness.    This note was created with the assistance of [employer] voice recognition software or phone dictation. There may be transcription errors as a result of using this technology. Effort has been made to assure accuracy of transcription but any obvious errors or omissions should be clarified with the author of the document.

## 2025-06-10 ENCOUNTER — OFFICE VISIT (OUTPATIENT)
Dept: PRIMARY CARE CLINIC | Facility: CLINIC | Age: 50
End: 2025-06-10
Payer: COMMERCIAL

## 2025-06-10 VITALS
BODY MASS INDEX: 29.82 KG/M2 | SYSTOLIC BLOOD PRESSURE: 137 MMHG | HEIGHT: 67 IN | HEART RATE: 61 BPM | DIASTOLIC BLOOD PRESSURE: 88 MMHG | RESPIRATION RATE: 18 BRPM | OXYGEN SATURATION: 99 % | WEIGHT: 190 LBS

## 2025-06-10 DIAGNOSIS — Z82.49 FAMILY HISTORY OF CORONARY ARTERY DISEASE: Chronic | ICD-10-CM

## 2025-06-10 DIAGNOSIS — E11.65 TYPE 2 DIABETES MELLITUS WITH HYPERGLYCEMIA, WITHOUT LONG-TERM CURRENT USE OF INSULIN: Chronic | ICD-10-CM

## 2025-06-10 DIAGNOSIS — E66.3 OVERWEIGHT (BMI 25.0-29.9): Chronic | ICD-10-CM

## 2025-06-10 DIAGNOSIS — E78.5 DYSLIPIDEMIA: Chronic | ICD-10-CM

## 2025-06-10 DIAGNOSIS — Z13.6 ENCOUNTER FOR SCREENING FOR CARDIOVASCULAR DISORDERS: ICD-10-CM

## 2025-06-10 DIAGNOSIS — Z12.5 SCREENING FOR PROSTATE CANCER: ICD-10-CM

## 2025-06-10 DIAGNOSIS — I10 PRIMARY HYPERTENSION: Primary | Chronic | ICD-10-CM

## 2025-06-10 PROBLEM — M79.10 MYALGIA DUE TO STATIN: Chronic | Status: RESOLVED | Noted: 2024-12-01 | Resolved: 2025-06-10

## 2025-06-10 PROBLEM — T46.6X5A MYALGIA DUE TO STATIN: Chronic | Status: RESOLVED | Noted: 2024-12-01 | Resolved: 2025-06-10

## 2025-06-10 RX ORDER — GLIPIZIDE 10 MG/1
10 TABLET, FILM COATED, EXTENDED RELEASE ORAL 2 TIMES DAILY
Qty: 180 TABLET | Refills: 3 | Status: SHIPPED | OUTPATIENT
Start: 2025-06-10

## 2025-06-10 RX ORDER — OLMESARTAN MEDOXOMIL 40 MG/1
40 TABLET ORAL DAILY
Qty: 90 TABLET | Refills: 3 | Status: SHIPPED | OUTPATIENT
Start: 2025-06-10 | End: 2026-06-10

## 2025-06-10 RX ORDER — AMLODIPINE BESYLATE 10 MG/1
10 TABLET ORAL DAILY
Qty: 90 TABLET | Refills: 3 | Status: SHIPPED | OUTPATIENT
Start: 2025-06-10

## 2025-06-10 RX ORDER — METFORMIN HYDROCHLORIDE 500 MG/1
500 TABLET ORAL 2 TIMES DAILY
Qty: 180 TABLET | Refills: 3 | Status: SHIPPED | OUTPATIENT
Start: 2025-06-10 | End: 2026-06-10

## 2025-06-10 RX ORDER — CARVEDILOL 25 MG/1
50 TABLET ORAL 2 TIMES DAILY
Qty: 360 TABLET | Refills: 3 | Status: SHIPPED | OUTPATIENT
Start: 2025-06-10

## 2025-06-16 ENCOUNTER — RESULTS FOLLOW-UP (OUTPATIENT)
Dept: PRIMARY CARE CLINIC | Facility: CLINIC | Age: 50
End: 2025-06-16

## 2025-06-17 ENCOUNTER — TELEPHONE (OUTPATIENT)
Dept: PRIMARY CARE CLINIC | Facility: CLINIC | Age: 50
End: 2025-06-17
Payer: COMMERCIAL

## 2025-06-17 DIAGNOSIS — E11.65 TYPE 2 DIABETES MELLITUS WITH HYPERGLYCEMIA, WITHOUT LONG-TERM CURRENT USE OF INSULIN: Primary | Chronic | ICD-10-CM

## 2025-06-17 RX ORDER — BLOOD-GLUCOSE SENSOR
EACH MISCELLANEOUS
Qty: 3 EACH | Refills: 11 | Status: SHIPPED | OUTPATIENT
Start: 2025-06-17

## 2025-06-17 NOTE — TELEPHONE ENCOUNTER
Copied from CRM #[id number]. Topic: General Inquiry - Patient Advice  >> Jun 17, 2025 11:46 AM Misbah wrote:  .Who Called: Humphrey Gallegos    Caller is requesting assistance/information from provider's office.    Symptoms (please be specific): pt is calling because he left out of the office about the pharmacy   How long has patient had these symptoms:  n/a   List of preferred pharmacies on file (remove unneeded): [unfilled]  If different, enter pharmacy into here including location and phone number: [facility]       Preferred Method of Contact: Phone Call  Patient's Preferred Phone Number on File: [phone]   Best Call Back Number, if different:  Additional Information:

## 2025-06-17 NOTE — TELEPHONE ENCOUNTER
Copied from CRM #[id number]. Topic: General Inquiry - Return Call  >> Jun 17, 2025  2:03 PM Sis wrote:  Who Called: Humphrey Gallegos    Patient is returning phone call    Who Left Message for Patient:Sanam  Does the patient know what this is regarding?:refills      Preferred Method of Contact: Phone Call  Patient's Preferred Phone Number on File: [phone]   Best Call Back Number, if different:  Additional Information: Patient states he has a missed call from clinic

## 2025-08-05 DIAGNOSIS — I10 PRIMARY HYPERTENSION: Primary | Chronic | ICD-10-CM

## 2025-08-05 RX ORDER — CARVEDILOL 25 MG/1
50 TABLET ORAL 2 TIMES DAILY
Qty: 360 TABLET | Refills: 3 | Status: SHIPPED | OUTPATIENT
Start: 2025-08-05

## 2025-08-18 RX ORDER — DAPAGLIFLOZIN 10 MG/1
10 TABLET, FILM COATED ORAL DAILY
Qty: 90 TABLET | Refills: 3 | Status: SHIPPED | OUTPATIENT
Start: 2025-08-18 | End: 2026-08-18